# Patient Record
Sex: FEMALE | Race: WHITE | NOT HISPANIC OR LATINO | Employment: PART TIME | URBAN - METROPOLITAN AREA
[De-identification: names, ages, dates, MRNs, and addresses within clinical notes are randomized per-mention and may not be internally consistent; named-entity substitution may affect disease eponyms.]

---

## 2017-01-03 ENCOUNTER — OFFICE VISIT (OUTPATIENT)
Dept: PULMONOLOGY | Facility: CLINIC | Age: 42
End: 2017-01-03

## 2017-01-03 VITALS
BODY MASS INDEX: 32.78 KG/M2 | OXYGEN SATURATION: 98 % | HEART RATE: 70 BPM | SYSTOLIC BLOOD PRESSURE: 122 MMHG | RESPIRATION RATE: 16 BRPM | WEIGHT: 192 LBS | HEIGHT: 64 IN | DIASTOLIC BLOOD PRESSURE: 78 MMHG | TEMPERATURE: 97.9 F

## 2017-01-03 DIAGNOSIS — R06.09 DYSPNEA ON EXERTION: Primary | ICD-10-CM

## 2017-01-03 DIAGNOSIS — J40 BRONCHITIS: ICD-10-CM

## 2017-01-03 PROCEDURE — 94010 BREATHING CAPACITY TEST: CPT | Performed by: INTERNAL MEDICINE

## 2017-01-03 PROCEDURE — 94729 DIFFUSING CAPACITY: CPT | Performed by: INTERNAL MEDICINE

## 2017-01-03 PROCEDURE — 99214 OFFICE O/P EST MOD 30 MIN: CPT | Performed by: INTERNAL MEDICINE

## 2017-01-03 PROCEDURE — 94726 PLETHYSMOGRAPHY LUNG VOLUMES: CPT | Performed by: INTERNAL MEDICINE

## 2017-01-03 NOTE — PATIENT INSTRUCTIONS
Lung function and exam are entirely normal today. I suspect this was a viral syndrome but I cannot rule out allergies. I think seen an allergist as an excellent idea. They may want to add Singulair to your other therapy but I think you should continue nasal steroids and antihistamine's for now. I see no evidence of a pulmonary process causing her symptoms.

## 2017-01-03 NOTE — LETTER
January 3, 2017     Cynthia Simental MD  [address] [facility] [address]  [facility] [address]    Patient: Key Ospina   YOB: 1975   Date of Visit: 1/3/2017       Dear Dr. Hola MD:    Key Ospina was in my office today. Below is a copy of my note.    If you have questions, please do not hesitate to call me. I look forward to following Key along with you.         Sincerely,        Dominick Hill MD        CC: No Recipients    Subjective   Key Ospina is a 41 y.o.  white female nonsmoker whom I have seen on one occasion 2/8/16 for complaints of dyspnea that came on after an upper respiratory tract infection. After antimicrobial therapy she noted dyspnea on exertion and for this reason I was asked to see her. She are been seen in an emergency room January 2016 when her dyspnea became more severe and she had leg edema. Her d-dimer was mildly elevated but CT angiogram was negative for PE. She subsequently underwent a cardiology consult with echocardiogram and a treadmill study at [facility] because she was worried about the possibility of pulmonary hypertension. There was however no evidence of this. The case is complicated because she has a lot of other associated symptomatology over the years including some confusion about positive EEG file titers, positive ANAs and some rheumatologic complaints (for which she underwent rheumatology evaluation). She also gave a history of GERD for which she underwent endoscopy in 2013 revealing only mild esophagitis/gastritis. She also underwent allergy evaluation with a Dr. Avila because of some repetitive upper respiratory symptoms, cough etc. (Has been treated with nonsedating antihistamine and nasal steroids). I was unable to find any abnormalities of any kind on previous CT scans, chest x-rays, PFTs, except for an isolated very mild reduction in diffusion capacity. My plans at that time were to follow her up in 3 months  and if her symptoms persisted I would proceed with the followin. Overnight sleep study for monitoring of oxygen saturations and to look for arrhythmias  2. Methacholine challenge test    History of Present Illness     Subsequent to that visit however, her symptoms essentially resolved and she had no problems so she did not keep a follow-up appointment. She now presents with recurrence of what sounds like an upper respiratory infection the latter part of November (myalgias, low-grade temps, cough and shortness of breath. She was placed on a course of Augmentin and a Medrol Dosepak 16 and did not really improved. She saw the APRN the next day 16 was given some Tessalon. She continued to have congestion and a Z-Papa was called in. She states that her symptoms improved dramatically within 30 hours and she became nearly asymptomatic. Subsequently last week she had a similar episode but much less severe. It again and was associated with some cough and shortness of breath as well as myalgias but this time she noted some hoarseness. These symptoms have now resolved spontaneously and she feels much better. She is still a little hoarse.    The following portions of the patient's history were reviewed and updated as appropriate: allergies, current medications, past family history, past medical history, past social history, past surgical history and problem list.    Review of Systems   Constitutional: Negative for appetite change, chills, diaphoresis, fatigue, fever and unexpected weight change.   HENT: Positive for congestion and voice change (hoarseness improving). Negative for ear discharge, ear pain, hearing loss, postnasal drip, rhinorrhea, sinus pressure, sneezing, sore throat and trouble swallowing.    Eyes: Negative for visual disturbance.   Respiratory: Positive for cough (now essentially resolved) and shortness of breath (now much improved). Negative for choking, chest tightness, wheezing and stridor.     Cardiovascular: Negative for chest pain, palpitations and leg swelling.   Gastrointestinal: Negative for abdominal pain, anal bleeding, blood in stool, constipation, diarrhea, nausea and vomiting.   Endocrine: Negative for cold intolerance, heat intolerance, polydipsia and polyuria.   Genitourinary: Negative for decreased urine volume, difficulty urinating, dysuria, frequency, hematuria and urgency.   Musculoskeletal: Positive for arthralgias and joint swelling (now much improved). Negative for back pain and myalgias.   Skin: Negative for pallor and rash.   Allergic/Immunologic: Negative for environmental allergies, food allergies and immunocompromised state.   Neurological: Negative for dizziness, seizures, syncope, speech difficulty, weakness and headaches.   Hematological: Negative for adenopathy. Does not bruise/bleed easily.   Psychiatric/Behavioral: Negative for confusion, decreased concentration and sleep disturbance. The patient is not nervous/anxious.        Prior to Admission medications    Medication Sig Start Date End Date Taking? Authorizing Provider   albuterol (PROVENTIL HFA;VENTOLIN HFA) 108 (90 BASE) MCG/ACT inhaler ProAir  (90 Base) MCG/ACT Inhalation Aerosol Solution; Patient Sig: ProAir  (90 Base) MCG/ACT Inhalation Aerosol Solution INHALE 2 PUFFS EVERY 4 TO 6 HOURS AS NEEDED FOR COUGH AND SHORTNESS OFBREATH; 1; 5; 12-Jan-2016; Active 1/12/16  Yes Cynthia Simental MD   ALPRAZolam (XANAX) 0.25 MG tablet Take 1 tablet by mouth Daily As Needed (prn). 11/29/16  Yes Cynthia Simental MD   DEXILANT 60 MG capsule TAKE ONE CAPSULE BY MOUTH DAILY  Patient taking differently: TAKE ONE CAPSULE BY MOUTH DAILY  as needed 5/23/16  Yes Cynthia Simental MD   fexofenadine (ALLEGRA) 180 MG tablet Take 1 tablet by mouth daily. 4/25/13  Yes Historical Provider, MD   fluticasone (FLONASE) 50 MCG/ACT nasal spray 2 sprays into each nostril daily. Administer 1 spray in each nostril twice daily. 2/28/16  Yes  "Historical Provider, MD   azithromycin (ZITHROMAX Z-JESSE) 250 MG tablet Take 2 tablets the first day, then 1 tablet daily for 4 days. 12/14/16 1/3/17  Cynthia Simental MD   benzonatate (TESSALON) 200 MG capsule Take 1 capsule by mouth 3 (Three) Times a Day As Needed for cough. 12/2/16 1/3/17  ABDULKADIR Martins   MethylPREDNISolone (MEDROL, JESSE,) 4 MG tablet Take as directed on package instructions. 12/1/16 1/3/17  Cynthia Simental MD   montelukast (SINGULAIR) 10 MG tablet Take 1 tablet by mouth Every Night. 11/29/16 1/3/17  Cynthia Simental MD   pitavastatin calcium (LIVALO) 1 MG tablet tablet Take 1 tablet by mouth Every Night. 11/29/16 1/3/17  Cynthia Simental MD       Objective   Blood pressure 122/78, pulse 70, temperature 97.9 °F (36.6 °C), resp. rate 16, height 63.5\" (161.3 cm), weight 192 lb (87.1 kg), SpO2 98 %.    Flowsheet Rows         First Filed Value    Admission Height  63.5\" (161.3 cm) Documented at 01/03/2017 1405    Admission Weight  192 lb (87.1 kg) Documented at 01/03/2017 1405        Physical Exam   Constitutional: She is oriented to person, place, and time. She appears well-developed and well-nourished.   Young white female who appears in excellent health   HENT:   Head: Normocephalic and atraumatic.   Right Ear: Hearing and external ear normal.   Left Ear: Hearing and external ear normal.   Nose: Nose normal.   Mouth/Throat: Oropharynx is clear and moist. No oropharyngeal exudate.   Eyes: Conjunctivae and EOM are normal. Pupils are equal, round, and reactive to light. Right eye exhibits no discharge. Left eye exhibits no discharge. No scleral icterus.   Neck: Normal range of motion. Neck supple. No JVD present. No tracheal deviation present. No thyromegaly present.   Cardiovascular: Normal rate, regular rhythm, normal heart sounds and intact distal pulses.  Exam reveals no gallop and no friction rub.    No murmur heard.  Pulmonary/Chest: Effort normal and breath sounds normal. No accessory muscle " usage or stridor. No apnea, no tachypnea and no bradypnea. No respiratory distress. She has no wheezes. She has no rhonchi. She has no rales. She exhibits no tenderness.   Abdominal: Soft. Bowel sounds are normal. She exhibits no distension and no mass. There is no splenomegaly or hepatomegaly. There is no tenderness. There is no rebound and no guarding. No hernia.   Musculoskeletal: Normal range of motion. She exhibits no edema, tenderness or deformity.   Lymphadenopathy:        Head (right side): No submandibular adenopathy present.        Head (left side): No submandibular adenopathy present.     She has no cervical adenopathy.        Right: No supraclavicular and no epitrochlear adenopathy present.        Left: No supraclavicular and no epitrochlear adenopathy present.   Neurological: She is alert and oriented to person, place, and time. She has normal reflexes. She displays normal reflexes. No cranial nerve deficit. She exhibits normal muscle tone. Coordination normal.   Skin: Skin is warm and dry. No bruising, no ecchymosis, no petechiae and no rash noted. She is not diaphoretic. No cyanosis or erythema. No pallor. Nails show no clubbing.   Psychiatric: She has a normal mood and affect. Her speech is normal and behavior is normal. Judgment and thought content normal.     Pulmonary function studies: FVC 3.5 (97%), FEV1 2.84 (96%) 6, FEV1/FVC ratio 81%, FEF 25/75 2.89 (93%), MVV 95 (82%), TLC 4.6 (93%), RV 1.09 (67%), RV/TLC ratio 24%, DLCO 20.4 (80%).  These studies are entirely normal and when compared to those of 2/8/16 have if anything shown improvement in FEV1 (which was already normal), and the diffusion impairment has resolved.    Assessment/Plan    Diagnosis Plan   1. Dyspnea on exertion   full PFTs        2. Bronchitis probably post viral     3. Abnormal serologic rheumatologic studies                 [facility]  4. Allergic rhinitis  5. History of GERD  6. History of  T&A, cholecystectomy,  ×2  7. History of ruptured ovarian cyst    Discussion: Her most recent episode of upper respiratory infection associated with dyspnea just sounds like a viral syndrome. I see nothing on exam or pulmonary function studies that would cause me any concern for any underlying pulmonary process. I did not obtain a chest x-ray or CT scan today but I do not think it is necessary as all previous studies have been unremarkable in this episode sounds very similar to those in the past. I think were primarily dealing with an allergic process but I could not rule out some chronic underlying sinusitis    Recommendations:  1. I think she should stay on her nasal steroids and antihistamine's  2. She already has an appointment with an allergist (Dr. Avila) and we will defer to her judgment but may want to add in Singulair at some point or even get a sinus CT scan to make sure were not dealing with an underlying chronic sinusitis  3. With no evidence of any pulmonary abnormality I plan to just see her on an as-needed basis    Dominick Hill MD  Pulmonary and Critical Care Medicine  17 3:44 PM

## 2017-01-03 NOTE — MR AVS SNAPSHOT
Key Ospina   1/3/2017 2:30 PM   Office Visit    Dept Phone:  [phone]   Encounter #:  [id number]    Provider:  Dominick Hill MD   Department:  [facility]                Your Full Care Plan              Today's Medication Changes          These changes are accurate as of: 1/3/17  4:22 PM.  If you have any questions, ask your nurse or doctor.               Stop taking medication(s)listed here:     azithromycin 250 MG tablet   Commonly known as:  ZITHROMAX Z-JESSE           benzonatate 200 MG capsule   Commonly known as:  TESSALON           MethylPREDNISolone 4 MG tablet   Commonly known as:  MEDROL (JESSE)           montelukast 10 MG tablet   Commonly known as:  SINGULAIR           pitavastatin calcium 1 MG tablet tablet   Commonly known as:  LIVALO                      Your Updated Medication List          This list is accurate as of: 1/3/17  4:22 PM.  Always use your most recent med list.                albuterol 108 (90 BASE) MCG/ACT inhaler   Commonly known as:  PROVENTIL HFA;VENTOLIN HFA       ALPRAZolam 0.25 MG tablet   Commonly known as:  XANAX   Take 1 tablet by mouth Daily As Needed (prn).       DEXILANT 60 MG capsule   Generic drug:  dexlansoprazole   TAKE ONE CAPSULE BY MOUTH DAILY       fexofenadine 180 MG tablet   Commonly known as:  ALLEGRA       fluticasone 50 MCG/ACT nasal spray   Commonly known as:  FLONASE               We Performed the Following     Pulmonary Function Test     Spirometry Without Bronchodilator       You Were Diagnosed With        Codes Comments    Dyspnea on exertion    -  Primary ICD-10-CM: R06.09  ICD-9-CM: 786.09     Bronchitis     ICD-10-CM: J40  ICD-9-CM: 490       Instructions    Lung function and exam are entirely normal today. I suspect this was a viral syndrome but I cannot rule out allergies. I think seen an allergist as an excellent idea. They may want to add Singulair to your other therapy but I think  "you should continue nasal steroids and antihistamine's for now. I see no evidence of a pulmonary process causing her symptoms.     Patient Instructions History      Upcoming Appointments     Visit Type Date Time Department    FOLLOW UP 1/3/2017  2:30 PM [facility]      MyChart Signup     Our records indicate that you have declined [facility] discoapihart signup. If you would like to sign up for [employer], please email [email or web address] or call [phone] to obtain an activation code.             Other Info from Your Visit           Allergies     Latex      Latex exam gloves    Methylprednisolone Intolerance     Manic feeling like she is climbing the wall       Reason for Visit     Shortness of Breath           Vital Signs     Blood Pressure Pulse Temperature Respirations Height Weight    122/78 70 97.9 °F (36.6 °C) 16 63.5\" (161.3 cm) 192 lb (87.1 kg)    Oxygen Saturation Body Mass Index Smoking Status             98% 33.48 kg/m2 Never Smoker         Problems and Diagnoses Noted     Breathlessness on exertion    Bronchitis          Results     Spirometry Without Bronchodilator           Pulmonary Function Test               "

## 2017-01-03 NOTE — PROGRESS NOTES
Subjective   Key Ospina is a 41 y.o.  white female nonsmoker whom I have seen on one occasion 16 for complaints of dyspnea that came on after an upper respiratory tract infection. After antimicrobial therapy she noted dyspnea on exertion and for this reason I was asked to see her. She are been seen in an emergency room 2016 when her dyspnea became more severe and she had leg edema. Her d-dimer was mildly elevated but CT angiogram was negative for PE. She subsequently underwent a cardiology consult with echocardiogram and a treadmill study at [facility] because she was worried about the possibility of pulmonary hypertension. There was however no evidence of this. The case is complicated because she has a lot of other associated symptomatology over the years including some confusion about positive EEG file titers, positive ANAs and some rheumatologic complaints (for which she underwent rheumatology evaluation). She also gave a history of GERD for which she underwent endoscopy in  revealing only mild esophagitis/gastritis. She also underwent allergy evaluation with a Dr. Avila because of some repetitive upper respiratory symptoms, cough etc. (Has been treated with nonsedating antihistamine and nasal steroids). I was unable to find any abnormalities of any kind on previous CT scans, chest x-rays, PFTs, except for an isolated very mild reduction in diffusion capacity. My plans at that time were to follow her up in 3 months and if her symptoms persisted I would proceed with the followin. Overnight sleep study for monitoring of oxygen saturations and to look for arrhythmias  2. Methacholine challenge test    History of Present Illness     Subsequent to that visit however, her symptoms essentially resolved and she had no problems so she did not keep a follow-up appointment. She now presents with recurrence of what sounds like an upper respiratory infection the latter part  of November (myalgias, low-grade temps, cough and shortness of breath. She was placed on a course of Augmentin and a Medrol Dosepak 12/1/16 and did not really improved. She saw the APRN the next day 12/2/16 was given some Tessalon. She continued to have congestion and a Z-Papa was called in. She states that her symptoms improved dramatically within 30 hours and she became nearly asymptomatic. Subsequently last week she had a similar episode but much less severe. It again and was associated with some cough and shortness of breath as well as myalgias but this time she noted some hoarseness. These symptoms have now resolved spontaneously and she feels much better. She is still a little hoarse.    The following portions of the patient's history were reviewed and updated as appropriate: allergies, current medications, past family history, past medical history, past social history, past surgical history and problem list.    Review of Systems   Constitutional: Negative for appetite change, chills, diaphoresis, fatigue, fever and unexpected weight change.   HENT: Positive for congestion and voice change (hoarseness improving). Negative for ear discharge, ear pain, hearing loss, postnasal drip, rhinorrhea, sinus pressure, sneezing, sore throat and trouble swallowing.    Eyes: Negative for visual disturbance.   Respiratory: Positive for cough (now essentially resolved) and shortness of breath (now much improved). Negative for choking, chest tightness, wheezing and stridor.    Cardiovascular: Negative for chest pain, palpitations and leg swelling.   Gastrointestinal: Negative for abdominal pain, anal bleeding, blood in stool, constipation, diarrhea, nausea and vomiting.   Endocrine: Negative for cold intolerance, heat intolerance, polydipsia and polyuria.   Genitourinary: Negative for decreased urine volume, difficulty urinating, dysuria, frequency, hematuria and urgency.   Musculoskeletal: Positive for arthralgias and joint  swelling (now much improved). Negative for back pain and myalgias.   Skin: Negative for pallor and rash.   Allergic/Immunologic: Negative for environmental allergies, food allergies and immunocompromised state.   Neurological: Negative for dizziness, seizures, syncope, speech difficulty, weakness and headaches.   Hematological: Negative for adenopathy. Does not bruise/bleed easily.   Psychiatric/Behavioral: Negative for confusion, decreased concentration and sleep disturbance. The patient is not nervous/anxious.        Prior to Admission medications    Medication Sig Start Date End Date Taking? Authorizing Provider   albuterol (PROVENTIL HFA;VENTOLIN HFA) 108 (90 BASE) MCG/ACT inhaler ProAir  (90 Base) MCG/ACT Inhalation Aerosol Solution; Patient Sig: ProAir  (90 Base) MCG/ACT Inhalation Aerosol Solution INHALE 2 PUFFS EVERY 4 TO 6 HOURS AS NEEDED FOR COUGH AND SHORTNESS OFBREATH; 1; 5; 12-Jan-2016; Active 1/12/16  Yes Cynthia Simental MD   ALPRAZolam (XANAX) 0.25 MG tablet Take 1 tablet by mouth Daily As Needed (prn). 11/29/16  Yes Cynthia Smiental MD   DEXILANT 60 MG capsule TAKE ONE CAPSULE BY MOUTH DAILY  Patient taking differently: TAKE ONE CAPSULE BY MOUTH DAILY  as needed 5/23/16  Yes Cynthia Simental MD   fexofenadine (ALLEGRA) 180 MG tablet Take 1 tablet by mouth daily. 4/25/13  Yes Historical Provider, MD   fluticasone (FLONASE) 50 MCG/ACT nasal spray 2 sprays into each nostril daily. Administer 1 spray in each nostril twice daily. 2/28/16  Yes Historical Provider, MD   azithromycin (ZITHROMAX Z-JESSE) 250 MG tablet Take 2 tablets the first day, then 1 tablet daily for 4 days. 12/14/16 1/3/17  Cynthia Simental MD   benzonatate (TESSALON) 200 MG capsule Take 1 capsule by mouth 3 (Three) Times a Day As Needed for cough. 12/2/16 1/3/17  ABDULKADIR Martins   MethylPREDNISolone (MEDROL, JESSE,) 4 MG tablet Take as directed on package instructions. 12/1/16 1/3/17  Cynthia Simental MD   montelukast (SINGULAIR)  "10 MG tablet Take 1 tablet by mouth Every Night. 11/29/16 1/3/17  Cynthia Simental MD   pitavastatin calcium (LIVALO) 1 MG tablet tablet Take 1 tablet by mouth Every Night. 11/29/16 1/3/17  Cynthia Simental MD       Objective   Blood pressure 122/78, pulse 70, temperature 97.9 °F (36.6 °C), resp. rate 16, height 63.5\" (161.3 cm), weight 192 lb (87.1 kg), SpO2 98 %.    Flowsheet Rows         First Filed Value    Admission Height  63.5\" (161.3 cm) Documented at 01/03/2017 1405    Admission Weight  192 lb (87.1 kg) Documented at 01/03/2017 1405        Physical Exam   Constitutional: She is oriented to person, place, and time. She appears well-developed and well-nourished.   Young white female who appears in excellent health   HENT:   Head: Normocephalic and atraumatic.   Right Ear: Hearing and external ear normal.   Left Ear: Hearing and external ear normal.   Nose: Nose normal.   Mouth/Throat: Oropharynx is clear and moist. No oropharyngeal exudate.   Eyes: Conjunctivae and EOM are normal. Pupils are equal, round, and reactive to light. Right eye exhibits no discharge. Left eye exhibits no discharge. No scleral icterus.   Neck: Normal range of motion. Neck supple. No JVD present. No tracheal deviation present. No thyromegaly present.   Cardiovascular: Normal rate, regular rhythm, normal heart sounds and intact distal pulses.  Exam reveals no gallop and no friction rub.    No murmur heard.  Pulmonary/Chest: Effort normal and breath sounds normal. No accessory muscle usage or stridor. No apnea, no tachypnea and no bradypnea. No respiratory distress. She has no wheezes. She has no rhonchi. She has no rales. She exhibits no tenderness.   Abdominal: Soft. Bowel sounds are normal. She exhibits no distension and no mass. There is no splenomegaly or hepatomegaly. There is no tenderness. There is no rebound and no guarding. No hernia.   Musculoskeletal: Normal range of motion. She exhibits no edema, tenderness or deformity. "   Lymphadenopathy:        Head (right side): No submandibular adenopathy present.        Head (left side): No submandibular adenopathy present.     She has no cervical adenopathy.        Right: No supraclavicular and no epitrochlear adenopathy present.        Left: No supraclavicular and no epitrochlear adenopathy present.   Neurological: She is alert and oriented to person, place, and time. She has normal reflexes. She displays normal reflexes. No cranial nerve deficit. She exhibits normal muscle tone. Coordination normal.   Skin: Skin is warm and dry. No bruising, no ecchymosis, no petechiae and no rash noted. She is not diaphoretic. No cyanosis or erythema. No pallor. Nails show no clubbing.   Psychiatric: She has a normal mood and affect. Her speech is normal and behavior is normal. Judgment and thought content normal.     Pulmonary function studies: FVC 3.5 (97%), FEV1 2.84 (96%) 6, FEV1/FVC ratio 81%, FEF 25/75 2.89 (93%), MVV 95 (82%), TLC 4.6 (93%), RV 1.09 (67%), RV/TLC ratio 24%, DLCO 20.4 (80%).  These studies are entirely normal and when compared to those of 16 have if anything shown improvement in FEV1 (which was already normal), and the diffusion impairment has resolved.    Assessment/Plan    Diagnosis Plan   1. Dyspnea on exertion   full PFTs        2. Bronchitis probably post viral     3. Abnormal serologic rheumatologic studies                 [facility]  4. Allergic rhinitis  5. History of GERD  6. History of T&A, cholecystectomy,  ×2  7. History of ruptured ovarian cyst    Discussion: Her most recent episode of upper respiratory infection associated with dyspnea just sounds like a viral syndrome. I see nothing on exam or pulmonary function studies that would cause me any concern for any underlying pulmonary process. I did not obtain a chest x-ray or CT scan today but I do not think it is necessary as all previous studies have been unremarkable in this  episode sounds very similar to those in the past. I think were primarily dealing with an allergic process but I could not rule out some chronic underlying sinusitis    Recommendations:  1. I think she should stay on her nasal steroids and antihistamine's  2. She already has an appointment with an allergist (Dr. Avila) and we will defer to her judgment but may want to add in Singulair at some point or even get a sinus CT scan to make sure were not dealing with an underlying chronic sinusitis  3. With no evidence of any pulmonary abnormality I plan to just see her on an as-needed basis    Dominick Hill MD  Pulmonary and Critical Care Medicine  01/03/17 3:44 PM

## 2017-01-20 ENCOUNTER — TELEPHONE (OUTPATIENT)
Dept: INTERNAL MEDICINE | Facility: CLINIC | Age: 42
End: 2017-01-20

## 2017-01-20 DIAGNOSIS — R76.8 ANA POSITIVE: Primary | ICD-10-CM

## 2017-01-24 ENCOUNTER — LAB (OUTPATIENT)
Dept: INTERNAL MEDICINE | Facility: CLINIC | Age: 42
End: 2017-01-24

## 2017-01-24 DIAGNOSIS — R76.8 ANA POSITIVE: ICD-10-CM

## 2017-01-24 LAB
ANION GAP SERPL CALCULATED.3IONS-SCNC: 8 MMOL/L (ref 3–11)
BILIRUB BLD-MCNC: NEGATIVE MG/DL
BUN BLD-MCNC: 14 MG/DL (ref 9–23)
BUN/CREAT SERPL: 17.5 (ref 7–25)
CALCIUM SPEC-SCNC: 9.9 MG/DL (ref 8.7–10.4)
CHLORIDE SERPL-SCNC: 103 MMOL/L (ref 99–109)
CLARITY, POC: CLEAR
CO2 SERPL-SCNC: 28 MMOL/L (ref 20–31)
COLOR UR: YELLOW
CREAT BLD-MCNC: 0.8 MG/DL (ref 0.6–1.3)
EXPIRATION DATE: NORMAL
GFR SERPL CREATININE-BSD FRML MDRD: 79 ML/MIN/1.73
GLUCOSE BLD-MCNC: 95 MG/DL (ref 70–100)
GLUCOSE UR STRIP-MCNC: NEGATIVE MG/DL
KETONES UR QL: NEGATIVE
LEUKOCYTE EST, POC: NEGATIVE
Lab: NORMAL
NITRITE UR-MCNC: NEGATIVE MG/ML
PH UR: 5 [PH] (ref 5–8)
POTASSIUM BLD-SCNC: 4.6 MMOL/L (ref 3.5–5.5)
PROT UR STRIP-MCNC: NEGATIVE MG/DL
RBC # UR STRIP: NEGATIVE /UL
SODIUM BLD-SCNC: 139 MMOL/L (ref 132–146)
SP GR UR: 1.02 (ref 1–1.03)
UROBILINOGEN UR QL: NORMAL

## 2017-01-24 PROCEDURE — 81002 URINALYSIS NONAUTO W/O SCOPE: CPT | Performed by: INTERNAL MEDICINE

## 2017-01-24 PROCEDURE — 80048 BASIC METABOLIC PNL TOTAL CA: CPT | Performed by: INTERNAL MEDICINE

## 2017-01-24 PROCEDURE — 36415 COLL VENOUS BLD VENIPUNCTURE: CPT | Performed by: INTERNAL MEDICINE

## 2017-01-30 ENCOUNTER — TELEPHONE (OUTPATIENT)
Dept: INTERNAL MEDICINE | Facility: CLINIC | Age: 42
End: 2017-01-30

## 2017-01-30 RX ORDER — ALBUTEROL SULFATE 90 UG/1
2 AEROSOL, METERED RESPIRATORY (INHALATION) EVERY 4 HOURS PRN
Qty: 1 INHALER | Refills: 11 | Status: SHIPPED | OUTPATIENT
Start: 2017-01-30 | End: 2017-01-30 | Stop reason: SDUPTHER

## 2017-01-30 RX ORDER — ALBUTEROL SULFATE 90 UG/1
AEROSOL, METERED RESPIRATORY (INHALATION)
Qty: 1 INHALER | Refills: 11 | OUTPATIENT
Start: 2017-01-30 | End: 2017-03-17 | Stop reason: SDUPTHER

## 2017-01-30 NOTE — TELEPHONE ENCOUNTER
----- Message from Lenora Moscoso sent at 1/30/2017 12:00 PM EST -----  Contact: [place] PH:[phone]  [place] PHARMACIST CALLING IN REGARDS TO BEVERLEY HINES TO CLARIFY INSTRUCTIONS FOR THE ALBUTEROL INHALER. THE PHARMACIST CAN BE REACHED [phone]

## 2017-01-30 NOTE — TELEPHONE ENCOUNTER
----- Message from Norma Gregory sent at 1/30/2017 10:18 AM EST -----  Contact: self  Pt called needing refill on Rx Proair. She can be reached at [phone]      [facility]- [facility]

## 2017-03-03 ENCOUNTER — TELEPHONE (OUTPATIENT)
Dept: INTERNAL MEDICINE | Facility: CLINIC | Age: 42
End: 2017-03-03

## 2017-03-03 ENCOUNTER — HOSPITAL ENCOUNTER (OUTPATIENT)
Dept: GENERAL RADIOLOGY | Facility: HOSPITAL | Age: 42
Discharge: HOME | End: 2017-03-03
Attending: INTERNAL MEDICINE | Admitting: INTERNAL MEDICINE

## 2017-03-03 ENCOUNTER — OFFICE VISIT (OUTPATIENT)
Dept: INTERNAL MEDICINE | Facility: CLINIC | Age: 42
End: 2017-03-03

## 2017-03-03 VITALS
BODY MASS INDEX: 32.1 KG/M2 | TEMPERATURE: 96.4 F | RESPIRATION RATE: 20 BRPM | DIASTOLIC BLOOD PRESSURE: 82 MMHG | HEART RATE: 72 BPM | WEIGHT: 184.13 LBS | SYSTOLIC BLOOD PRESSURE: 140 MMHG

## 2017-03-03 DIAGNOSIS — J30.89 NON-SEASONAL ALLERGIC RHINITIS DUE TO OTHER ALLERGIC TRIGGER: ICD-10-CM

## 2017-03-03 DIAGNOSIS — J06.9 UPPER RESPIRATORY TRACT INFECTION, UNSPECIFIED TYPE: Primary | ICD-10-CM

## 2017-03-03 DIAGNOSIS — R05.9 COUGH: ICD-10-CM

## 2017-03-03 PROCEDURE — 71020 HC CHEST PA AND LATERAL: CPT

## 2017-03-03 PROCEDURE — 86580 TB INTRADERMAL TEST: CPT | Performed by: INTERNAL MEDICINE

## 2017-03-03 PROCEDURE — 99214 OFFICE O/P EST MOD 30 MIN: CPT | Performed by: INTERNAL MEDICINE

## 2017-03-03 RX ORDER — AZELASTINE HCL 205.5 UG/1
2 SPRAY NASAL 2 TIMES DAILY PRN
Qty: 1 EACH | Refills: 5 | Status: SHIPPED | OUTPATIENT
Start: 2017-03-03 | End: 2017-06-23

## 2017-03-03 NOTE — TELEPHONE ENCOUNTER
Spoke with Bronson and she states she was not swabbed for whooping cough  Today at her appt.   Advised her to come in on Monday morning for us to collect.  Verb understanding given.

## 2017-03-03 NOTE — PROGRESS NOTES
Subjective       Key Ospina is a 41 y.o. female.     Chief Complaint   Patient presents with   • URI     took zpack last week    • Cough     productive light green  shortness of breath         History obtained from the patient.      URI    This is a new problem. Episode onset: 3 weeks ago. The problem has been gradually improving. The maximum temperature recorded prior to her arrival was 100.4 - 100.9 F. Associated symptoms include coughing (harsh, dry, productive of purulent sputum), ear pain (intermittent pressure), headaches, joint pain (improved), neck pain, a rash (occasional on chest and face), sinus pain (resolved) and sneezing (resolved). Pertinent negatives include no abdominal pain, chest pain, congestion, diarrhea, joint swelling, nausea, rhinorrhea, sore throat, swollen glands, vomiting or wheezing. She has tried NSAIDs (On Allegra and Flonase daily.  Tokk 7 days of Augmentin at home, then given a Zpack 1 week ago at Urgent Care.   Using her inhaler every 3-4 days.) for the symptoms. The treatment provided mild relief.      The patient denies known exposure to TB and Pertussis.      The following portions of the patient's history were reviewed and updated as appropriate: allergies, current medications, past family history, past medical history, past social history, past surgical history and problem list.      Review of Systems   Constitutional: Positive for fever.   HENT: Positive for ear pain (intermittent pressure), sinus pressure (resolved), sneezing (resolved) and voice change (hoarse). Negative for congestion, postnasal drip, rhinorrhea and sore throat.    Eyes: Negative for pain, discharge, redness and itching.   Respiratory: Positive for cough (harsh, dry, productive of purulent sputum), chest tightness and shortness of breath. Negative for wheezing.    Cardiovascular: Negative for chest pain.   Gastrointestinal: Negative for abdominal pain, diarrhea, nausea and vomiting.    Musculoskeletal: Positive for arthralgias, joint pain (improved) and neck pain. Negative for myalgias.   Skin: Positive for rash (occasional on chest and face).   Neurological: Positive for headaches.   Hematological: Negative for adenopathy.         Blood pressure 140/82, pulse 72, temperature 96.4 °F (35.8 °C), temperature source Temporal Artery , resp. rate 20, weight 184 lb 2 oz (83.5 kg).      Objective     Physical Exam   Constitutional:   Overweight.   HENT:   Head: Normocephalic and atraumatic.   Right Ear: Tympanic membrane, external ear and ear canal normal.   Left Ear: Tympanic membrane, external ear and ear canal normal.   Mouth/Throat: Oropharynx is clear and moist and mucous membranes are normal. No oral lesions.   Tonsils normal.  No sinus tenderness to palpation.   Eyes: Conjunctivae are normal.   Neck: Normal range of motion. Neck supple.   Cardiovascular: Normal rate and regular rhythm.    No murmur heard.  Pulmonary/Chest: Effort normal and breath sounds normal.   There is a harsh dry cough.   Lymphadenopathy:     She has no cervical adenopathy.   Skin: No rash noted.   Psychiatric: She has a normal mood and affect.   Nursing note and vitals reviewed.        Assessment/Plan   Key was seen today for uri and cough.    Diagnoses and all orders for this visit:    Upper respiratory tract infection, unspecified type    Cough  -     XR chest pa and lateral  -     TB Skin Test  -     B Pertussis Culture    Non-seasonal allergic rhinitis due to other allergic trigger  -     azelastine (ASTEPRO) 0.15 % solution nasal spray; 2 sprays into each nostril 2 (Two) Times a Day As Needed for allergies.    Recommend continue current medication.  May add Delsym.    Return for Next scheduled follow up.

## 2017-03-03 NOTE — TELEPHONE ENCOUNTER
----- Message from Lenora Moscoso sent at 3/3/2017  2:19 PM EST -----  Contact: BEVERLEY FLORA  BEVERLEY THOMASON CALLING WITH A QUESTION ABOUT THE APPT TODAY. SHE DOESN'T WANT TO GO INTO DETAIL OTHER THAN SOMETHING MIGHT HAVE BEEN LEFT OUT OF THE APPT. SHE CAN BE REACHED [phone]

## 2017-03-06 LAB
INDURATION: 0 MM (ref 0–10)
TB SKIN TEST: NEGATIVE

## 2017-03-20 RX ORDER — ALBUTEROL SULFATE 90 UG/1
AEROSOL, METERED RESPIRATORY (INHALATION)
Qty: 1 INHALER | Refills: 11 | OUTPATIENT
Start: 2017-03-20 | End: 2017-03-21

## 2017-03-21 ENCOUNTER — TELEPHONE (OUTPATIENT)
Dept: INTERNAL MEDICINE | Facility: CLINIC | Age: 42
End: 2017-03-21

## 2017-03-21 RX ORDER — ALBUTEROL SULFATE 90 UG/1
2 AEROSOL, METERED RESPIRATORY (INHALATION) EVERY 4 HOURS PRN
Qty: 1 INHALER | Refills: 11 | OUTPATIENT
Start: 2017-03-21 | End: 2019-03-14

## 2017-03-21 NOTE — TELEPHONE ENCOUNTER
----- Message from Norma Gregory sent at 3/21/2017  8:55 AM EDT -----  RIGOBERTO WITH [facility] CALLED AND STATED PT WANTED TO CHANGE HER INHALER TO PROAIR AND THEY NEEDED CONFIRMATION THAT THIS WAS OKAY. SHE STATED THAT THE PATIENT IS LEAVING TOWN AT NOON AND WOULD LIKE AN ANSWER BEFORE THIS. SHE CAN BE REACHED [phone]

## 2017-05-05 ENCOUNTER — TELEPHONE (OUTPATIENT)
Dept: INTERNAL MEDICINE | Facility: CLINIC | Age: 42
End: 2017-05-05

## 2017-05-05 DIAGNOSIS — H10.30 ACUTE CONJUNCTIVITIS, UNSPECIFIED ACUTE CONJUNCTIVITIS TYPE, UNSPECIFIED LATERALITY: Primary | ICD-10-CM

## 2017-05-05 RX ORDER — SULFACETAMIDE SODIUM 100 MG/ML
2 SOLUTION/ DROPS OPHTHALMIC
Qty: 15 ML | Refills: 0 | Status: SHIPPED | OUTPATIENT
Start: 2017-05-05 | End: 2017-06-23

## 2017-06-14 ENCOUNTER — TELEPHONE (OUTPATIENT)
Dept: INTERNAL MEDICINE | Facility: CLINIC | Age: 42
End: 2017-06-14

## 2017-06-14 NOTE — TELEPHONE ENCOUNTER
----- Message from Judy Beltran sent at 6/13/2017  3:14 PM EDT -----  [phone]    PT NEEDS TO FU ABOUT SOME SPECIALTY APPTS SHE HAS HAD.  PLEASE CALL TO DISCUSS    PT UNDERSTANDS THIS MESSAGE WILL NOT BE SEEN UNTIL Wednesday 6/14

## 2017-06-14 NOTE — TELEPHONE ENCOUNTER
BEBA:  Spoke with Pt and she states she is going to see a new pulmonologist next Friday the 23rd. States she will call you back after that appointment to update you.

## 2017-06-23 ENCOUNTER — OFFICE VISIT (OUTPATIENT)
Dept: PULMONOLOGY | Facility: CLINIC | Age: 42
End: 2017-06-23

## 2017-06-23 ENCOUNTER — HOSPITAL ENCOUNTER (OUTPATIENT)
Dept: CT IMAGING | Facility: HOSPITAL | Age: 42
Discharge: HOME | End: 2017-06-23
Attending: INTERNAL MEDICINE | Admitting: INTERNAL MEDICINE

## 2017-06-23 VITALS
RESPIRATION RATE: 16 BRPM | DIASTOLIC BLOOD PRESSURE: 74 MMHG | WEIGHT: 180 LBS | HEART RATE: 61 BPM | HEIGHT: 64 IN | TEMPERATURE: 97.3 F | SYSTOLIC BLOOD PRESSURE: 118 MMHG | OXYGEN SATURATION: 98 % | BODY MASS INDEX: 30.73 KG/M2

## 2017-06-23 DIAGNOSIS — R06.09 DYSPNEA ON EXERTION: Primary | ICD-10-CM

## 2017-06-23 DIAGNOSIS — R76.0 ELEVATED EBV ANTIBODY TITER QUANTITATIVE: ICD-10-CM

## 2017-06-23 DIAGNOSIS — J30.1 NON-SEASONAL ALLERGIC RHINITIS DUE TO POLLEN: ICD-10-CM

## 2017-06-23 DIAGNOSIS — M32.9 LUPUS (SYSTEMIC LUPUS ERYTHEMATOSUS) (HCC): ICD-10-CM

## 2017-06-23 PROCEDURE — 99214 OFFICE O/P EST MOD 30 MIN: CPT | Performed by: INTERNAL MEDICINE

## 2017-06-23 PROCEDURE — 71250 CT THORAX DX C-: CPT

## 2017-06-23 NOTE — PATIENT INSTRUCTIONS
While you carry a diagnosis of lupus and had symptoms of recurrent upper respiratory infection, to date there is been no evidence of lung involvement on pulmonary function studies, scans, etc. In addition echocardiograms at [facility] have not revealed pulmonary hypertension    Recommendation:  1. High-resolution CT scan of the chest today for a baseline  2. Follow-up in our office in one year with repeat pulmonary function studies. We will decide at that time whether to repeat high-resolution CT of the chest for comparison or to just continue to observe clinically  3. Would recommend yearly echocardiogram for now  4. The decision on therapy will be up to your rheumatologist, but at present I do not think is unreasonable to just continue to observe without institution of therapy  5. Absolutely essential that you keep up-to-date with all recommended vaccinations

## 2017-06-23 NOTE — PROGRESS NOTES
Subjective   Key Ospina is a 41 y.o.  white female nonsmoker who has seen on 2 prior occasions. 2/8/16 and 1/3/17. These were for complaints of dyspnea that would come on after recurrent upper respiratory infections (in the winter). She has been evaluated in various places for these symptoms and has actually had CT angiograms that have been negative for PE. She has also seen cardiology at [facility] and has undergone echocardiograms and treadmill studies both of which were unremarkable. Interestingly enough she has had positive serologic studies consistent with a diagnosis of lupus but she does not meet full criteria for lupus (had positive ANAs, as well as double-stranded DNA's). Has undergone rheumatologic evaluation the past and her most recent evaluation by Dr. Luna at [place] 4/2017 indicates that she is now felt to have lupus. The possibility of treatment with agents such as Plaquenil has been entertained but she does not seem to have end organ damage, so for now therapy is being held. She has been told by her rheumatologist at [place] to continue follow-up with pulmonary medicine.    History of Present Illness   She is actually doing fairly well but still has dyspnea on exertion. There is no chronic purulent cough, hemoptysis, pleuritic pain and no chronic lower extremity edema, PND, orthopnea, palpitations, or exertional left chest pain. Does however note occasional chest tightness    The following portions of the patient's history were reviewed and updated as appropriate: allergies, current medications, past family history, past medical history, past social history, past surgical history and problem list.    Review of Systems   Constitutional: Negative for appetite change, chills, diaphoresis, fatigue, fever and unexpected weight change.   HENT: Positive for tinnitus. Negative for congestion, ear discharge, ear pain, hearing loss, postnasal drip, rhinorrhea, sinus  pressure, sneezing, sore throat, trouble swallowing and voice change.         Occasional swelling of salivary glands   Eyes: Negative for visual disturbance.   Respiratory: Positive for chest tightness and shortness of breath. Negative for cough, choking, wheezing and stridor.    Cardiovascular: Negative for chest pain, palpitations and leg swelling.   Gastrointestinal: Negative for abdominal pain, anal bleeding, blood in stool, constipation, diarrhea, nausea and vomiting.   Endocrine: Positive for cold intolerance. Negative for heat intolerance, polydipsia and polyuria.   Genitourinary: Negative for decreased urine volume, difficulty urinating, dysuria, frequency, hematuria and urgency.   Musculoskeletal: Positive for arthralgias and joint swelling. Negative for back pain and myalgias.   Skin: Negative for pallor and rash.   Allergic/Immunologic: Negative for environmental allergies, food allergies and immunocompromised state.   Neurological: Negative for dizziness, seizures, syncope, speech difficulty, weakness and headaches.   Hematological: Negative for adenopathy. Does not bruise/bleed easily.   Psychiatric/Behavioral: Positive for sleep disturbance. Negative for confusion and decreased concentration. The patient is not nervous/anxious.    All other systems reviewed and are negative.      Medications    Medication Sig Start Date End Date Taking? Authorizing Provider   albuterol (PROAIR HFA) 108 (90 BASE) MCG/ACT inhaler Inhale 2 puffs Every 4 (Four) Hours As Needed for Wheezing. 3/21/17  Yes Cynthia Simental MD   ALPRAZolam (XANAX) 0.25 MG tablet Take 1 tablet by mouth Daily As Needed (prn). 11/29/16  Yes Cynthia Simental MD   fexofenadine (ALLEGRA) 180 MG tablet Take 1 tablet by mouth daily. 4/25/13  Yes Historical Provider, MD   fluticasone (FLONASE) 50 MCG/ACT nasal spray 2 sprays into each nostril daily. Administer 1 spray in each nostril twice daily. 2/28/16  Yes Historical Provider, MD   azelastine (ASTEPRO)  "0.15 % solution nasal spray 2 sprays into each nostril 2 (Two) Times a Day As Needed for allergies. 3/3/17 6/23/17  Cynthia Simental MD   sulfacetamide (BLEPH-10) 10 % ophthalmic solution Administer 2 drops to both eyes Every 3 (Three) Hours. 5/5/17 6/23/17  Cynthia Simental MD       Objective   Blood pressure 118/74, pulse 61, temperature 97.3 °F (36.3 °C), resp. rate 16, height 63.5\" (161.3 cm), weight 180 lb (81.6 kg), SpO2 98 %.    Flowsheet Rows         First Filed Value    Admission Height  63.5\" (161.3 cm) Documented at 06/23/2017 0833    Admission Weight  180 lb (81.6 kg) Documented at 06/23/2017 0833        Physical Exam   Constitutional: She is oriented to person, place, and time. She appears well-nourished.   Well-developed attractive white female in no acute distress   HENT:   Head: Normocephalic and atraumatic.   Right Ear: Hearing and external ear normal.   Left Ear: Hearing and external ear normal.   Nose: Nose normal.   Mouth/Throat: Oropharynx is clear and moist. No oropharyngeal exudate.   Eyes: Conjunctivae and EOM are normal. Pupils are equal, round, and reactive to light. Right eye exhibits no discharge. Left eye exhibits no discharge. No scleral icterus.   Neck: Normal range of motion. Neck supple. No JVD present. No tracheal deviation present. No thyromegaly present.   Cardiovascular: Normal rate, regular rhythm, normal heart sounds and intact distal pulses.  Exam reveals no gallop and no friction rub.    No murmur heard.  Pulmonary/Chest: Effort normal and breath sounds normal. No accessory muscle usage or stridor. No apnea, no tachypnea and no bradypnea. No respiratory distress. She has no wheezes. She has no rhonchi. She has no rales. She exhibits no tenderness.   Abdominal: Soft. Bowel sounds are normal. She exhibits no distension and no mass. There is no splenomegaly or hepatomegaly. There is no tenderness. There is no rebound and no guarding. No hernia.   Musculoskeletal: Normal range of " motion. She exhibits no edema, tenderness or deformity.   Lymphadenopathy:        Head (right side): No submandibular adenopathy present.        Head (left side): No submandibular adenopathy present.     She has no cervical adenopathy.        Right: No supraclavicular and no epitrochlear adenopathy present.        Left: No supraclavicular and no epitrochlear adenopathy present.   Neurological: She is alert and oriented to person, place, and time. She has normal reflexes. She displays normal reflexes. No cranial nerve deficit. She exhibits normal muscle tone. Coordination normal.   Skin: Skin is warm and dry. No bruising, no ecchymosis, no petechiae and no rash noted. She is not diaphoretic. No cyanosis or erythema. No pallor. Nails show no clubbing.   Psychiatric: She has a normal mood and affect. Her speech is normal and behavior is normal. Judgment and thought content normal.   Nursing note and vitals reviewed.      Assessment/Plan   1. Dyspnea on exertion  2. Diagnosis of lupus with abnormal serologic studies (YOKO and DS DNA)  A. Followed by Dr. Luna rheumatologist at [facility]  3. Allergic rhinitis  4. History of GERD  5. History of T&A, cholecystectomy,  ×2  6. History of ruptured ovarian cyst    Discussion: Patient's main focus is to avoid progression of underlying disease process with the development of an organ damage (lung or kidney), and to pick it up early if it does occur. She does not really want therapy at the present time but does want close follow-up. I think this is entirely reasonable    Plan:  1. High-resolution CT scan of the chest today for for baseline (will watch for ILD)  2. We will follow up in one year with full PFTs to watch for restrictive ventilatory defect  3. I recommended a repeat echocardiogram next year as well  4. Depending on her pulmonary symptomatology can repeat CT scans as necessary and compare back to her baseline study which will be obtained today    Dominick FAYE  MD [facility]  06/23/17 9:29 AM

## 2017-06-26 ENCOUNTER — TRANSCRIBE ORDERS (OUTPATIENT)
Dept: PULMONOLOGY | Facility: CLINIC | Age: 42
End: 2017-06-26

## 2017-06-26 ENCOUNTER — TELEPHONE (OUTPATIENT)
Dept: INTERNAL MEDICINE | Facility: CLINIC | Age: 42
End: 2017-06-26

## 2017-06-26 PROBLEM — R76.8 POSITIVE ANA (ANTINUCLEAR ANTIBODY): Status: ACTIVE | Noted: 2017-06-23

## 2017-06-26 NOTE — TELEPHONE ENCOUNTER
CT lung negative with the exception of mild  bronchiectasis (enlargement of airways) in the bases of the lungs.  This could possibly be related to her shortness of breath, but it is low grade.  Can be seen in immune disorders, but not necessarily related to Lupus..

## 2017-06-26 NOTE — TELEPHONE ENCOUNTER
PT RETURNED CALL.  BELOW INFO PER DR HOWE GIVEN.  VERB UNDERSTANDING BUT STATES WOULD LIKE TO KNOW IF DR HOWE HAS ANY IDEA WHAT WOULD CAUSE THAT IF NOT RELATED TO THE LUPUS.  STATES SHE IS TREATING HOLILISTICALLY  CURRENTLY BUT THAT SHE MIGHT CONSIDER THE PLAQUENIL THAT PULMONARY IS SUGGESTING.  STATES SHE WOULD LIKE TO SEE IF SHE NEEDS TO SCHEDULE AN APPT TO COME IN AND FOLLOW UP OR WHAT DR HOWE RECOMMENDS.

## 2017-06-26 NOTE — TELEPHONE ENCOUNTER
Called and spoke with patient regarding lung CT results.  Questions answered.  Discussed her possible diagnosis of lupus with regard to her pulmonary symptoms.  She will speak with Dr. Glynn again to clarify her diagnosis.  She will call me back after doing that and also speaking with Dr. Hill regarding CT results.

## 2017-06-26 NOTE — TELEPHONE ENCOUNTER
----- Message from Vanna Odonnell sent at 6/26/2017 11:04 AM EDT -----  Patient had a CT chest done last week by pulmonary.  She had it done downstairs and wants to know if Dr. Simental can give her those results because she has heard back from pulm yet.  Patient can be reached at [phone].

## 2017-06-27 ENCOUNTER — TELEPHONE (OUTPATIENT)
Dept: PULMONOLOGY | Facility: CLINIC | Age: 42
End: 2017-06-27

## 2017-06-27 NOTE — TELEPHONE ENCOUNTER
I had a lengthy phone call with Mrs. Ospina discussing her CT results.  There was no evidence of ILD but minimal bronchiectasis.  She had already discussed these findings with her primary care but wanted to hear from pulmonary as well.    We reviewed the bronchiectasis is a chronic condition.  She asked about treatment.  She reports she was sick for almost 12 weeks straight last winter and she would like to avoid that.  We discussed staying on top of respiratory infections and treating them promptly.  She likes to treat her conditions holistically but states that she does understand she may have to take some medication for any exacerbations.  We discussed her allergy medications and controlling her nasal drainage.  We reviewed when to call our office versus went to see primary care.    At her last visit with Dr. Hill, he recommended a yearly follow-up however she would like to know if she can be seen in 6 months instead.  She is aware of Dr. Hill's FPC but will like to see him before then if at all possible.  I let her know that there may not be any appointments available with Dr. Hill prior to his FPC, but she may be seen by one of our other physicians.  She may also call when she feels like she is developing respiratory symptoms and be seen by one of our nurse practitioners.

## 2017-06-28 ENCOUNTER — OFFICE VISIT (OUTPATIENT)
Dept: INTERNAL MEDICINE | Facility: CLINIC | Age: 42
End: 2017-06-28

## 2017-06-28 VITALS
RESPIRATION RATE: 18 BRPM | BODY MASS INDEX: 31.21 KG/M2 | HEART RATE: 60 BPM | DIASTOLIC BLOOD PRESSURE: 84 MMHG | SYSTOLIC BLOOD PRESSURE: 122 MMHG | TEMPERATURE: 98.5 F | WEIGHT: 179 LBS

## 2017-06-28 DIAGNOSIS — J06.9 UPPER RESPIRATORY TRACT INFECTION, UNSPECIFIED TYPE: Primary | ICD-10-CM

## 2017-06-28 DIAGNOSIS — L65.9 HAIR LOSS: ICD-10-CM

## 2017-06-28 DIAGNOSIS — E78.49 OTHER HYPERLIPIDEMIA: ICD-10-CM

## 2017-06-28 DIAGNOSIS — I10 ESSENTIAL HYPERTENSION: ICD-10-CM

## 2017-06-28 DIAGNOSIS — R76.8 POSITIVE ANA (ANTINUCLEAR ANTIBODY): ICD-10-CM

## 2017-06-28 DIAGNOSIS — R73.03 PREDIABETES: ICD-10-CM

## 2017-06-28 PROBLEM — J47.9 BRONCHIECTASIS WITHOUT COMPLICATION (HCC): Status: ACTIVE | Noted: 2017-06-28

## 2017-06-28 LAB
ALBUMIN SERPL-MCNC: 4.3 G/DL (ref 3.2–4.8)
ALBUMIN/GLOB SERPL: 1.7 G/DL (ref 1.5–2.5)
ALP SERPL-CCNC: 53 U/L (ref 25–100)
ALT SERPL W P-5'-P-CCNC: 14 U/L (ref 7–40)
ANION GAP SERPL CALCULATED.3IONS-SCNC: 6 MMOL/L (ref 3–11)
ARTICHOKE IGE QN: 161 MG/DL (ref 0–130)
AST SERPL-CCNC: 17 U/L (ref 0–33)
BILIRUB SERPL-MCNC: 0.8 MG/DL (ref 0.3–1.2)
BUN BLD-MCNC: 19 MG/DL (ref 9–23)
BUN/CREAT SERPL: 21.1 (ref 7–25)
CALCIUM SPEC-SCNC: 9.6 MG/DL (ref 8.7–10.4)
CHLORIDE SERPL-SCNC: 105 MMOL/L (ref 99–109)
CHOLEST SERPL-MCNC: 241 MG/DL (ref 0–200)
CO2 SERPL-SCNC: 28 MMOL/L (ref 20–31)
CREAT BLD-MCNC: 0.9 MG/DL (ref 0.6–1.3)
DEPRECATED RDW RBC AUTO: 40.8 FL (ref 37–54)
ERYTHROCYTE [DISTWIDTH] IN BLOOD BY AUTOMATED COUNT: 12.8 % (ref 11.3–14.5)
EXPIRATION DATE: NORMAL
GFR SERPL CREATININE-BSD FRML MDRD: 69 ML/MIN/1.73
GLOBULIN UR ELPH-MCNC: 2.5 GM/DL
GLUCOSE BLD-MCNC: 103 MG/DL (ref 70–100)
HBA1C MFR BLD: 5.6 %
HCT VFR BLD AUTO: 40.7 % (ref 34.5–44)
HDLC SERPL-MCNC: 40 MG/DL (ref 40–60)
HGB BLD-MCNC: 13.1 G/DL (ref 11.5–15.5)
Lab: NORMAL
MCH RBC QN AUTO: 28.6 PG (ref 27–31)
MCHC RBC AUTO-ENTMCNC: 32.2 G/DL (ref 32–36)
MCV RBC AUTO: 88.9 FL (ref 80–99)
PLATELET # BLD AUTO: 285 10*3/MM3 (ref 150–450)
PMV BLD AUTO: 9.3 FL (ref 6–12)
POTASSIUM BLD-SCNC: 4.1 MMOL/L (ref 3.5–5.5)
PROT SERPL-MCNC: 6.8 G/DL (ref 5.7–8.2)
RBC # BLD AUTO: 4.58 10*6/MM3 (ref 3.89–5.14)
SODIUM BLD-SCNC: 139 MMOL/L (ref 132–146)
T4 FREE SERPL-MCNC: 1.27 NG/DL (ref 0.89–1.76)
TRIGL SERPL-MCNC: 158 MG/DL (ref 0–150)
TSH SERPL DL<=0.05 MIU/L-ACNC: 1.8 MIU/ML (ref 0.35–5.35)
WBC NRBC COR # BLD: 7.73 10*3/MM3 (ref 3.5–10.8)

## 2017-06-28 PROCEDURE — 36415 COLL VENOUS BLD VENIPUNCTURE: CPT | Performed by: INTERNAL MEDICINE

## 2017-06-28 PROCEDURE — 80053 COMPREHEN METABOLIC PANEL: CPT | Performed by: INTERNAL MEDICINE

## 2017-06-28 PROCEDURE — 84439 ASSAY OF FREE THYROXINE: CPT | Performed by: INTERNAL MEDICINE

## 2017-06-28 PROCEDURE — 86038 ANTINUCLEAR ANTIBODIES: CPT | Performed by: INTERNAL MEDICINE

## 2017-06-28 PROCEDURE — 86225 DNA ANTIBODY NATIVE: CPT | Performed by: INTERNAL MEDICINE

## 2017-06-28 PROCEDURE — 85027 COMPLETE CBC AUTOMATED: CPT | Performed by: INTERNAL MEDICINE

## 2017-06-28 PROCEDURE — 80061 LIPID PANEL: CPT | Performed by: INTERNAL MEDICINE

## 2017-06-28 PROCEDURE — 84443 ASSAY THYROID STIM HORMONE: CPT | Performed by: INTERNAL MEDICINE

## 2017-06-28 PROCEDURE — 99214 OFFICE O/P EST MOD 30 MIN: CPT | Performed by: INTERNAL MEDICINE

## 2017-06-28 PROCEDURE — 83036 HEMOGLOBIN GLYCOSYLATED A1C: CPT | Performed by: INTERNAL MEDICINE

## 2017-06-28 RX ORDER — AMOXICILLIN AND CLAVULANATE POTASSIUM 875; 125 MG/1; MG/1
1 TABLET, FILM COATED ORAL 2 TIMES DAILY
Qty: 20 TABLET | Refills: 0 | Status: SHIPPED | OUTPATIENT
Start: 2017-06-28 | End: 2017-07-08

## 2017-06-28 RX ORDER — FLUCONAZOLE 150 MG/1
150 TABLET ORAL ONCE
Qty: 2 TABLET | Refills: 0 | Status: SHIPPED | OUTPATIENT
Start: 2017-06-28 | End: 2018-01-31 | Stop reason: SDUPTHER

## 2017-06-28 RX ORDER — AZELASTINE 1 MG/ML
1 SPRAY, METERED NASAL DAILY PRN
COMMUNITY
End: 2017-10-19

## 2017-06-28 NOTE — PROGRESS NOTES
Subjective       Key Ospina is a 41 y.o. female.     Chief Complaint   Patient presents with   • Follow-up     F/U FROM PULMONOLOGY       History obtained from the patient.      URI    This is a new problem. Episode onset: 3-4 days ago. The problem has been gradually worsening. There has been no fever. Associated symptoms include congestion, coughing (wet and dry, occasionally productive of clear sputum), headaches, joint pain (unchanged), a plugged ear sensation, rhinorrhea (clear), sinus pain and a sore throat. Pertinent negatives include no abdominal pain, chest pain, diarrhea, ear pain, joint swelling, nausea, neck pain, rash, swollen glands, vomiting or wheezing. Treatments tried: Allegra, Flonase, and Astelin. The treatment provided mild relief.      The patient recently had a high definition CT scan of her lungs to evaluate her shortness of breath, specifically in light of the possible SLE diagnosis.  The CT was negative with the exception of mild bronchiectasis in the bases.  The patient states she talked with Dr. Hill's nurse who relayed that he was not concerned at this time, but wanted to watch this closely.  The patient is concerned because Dr. Hill is retiring in September.  The patient has not discussed these findings with her Rheumatologist at [place] yet.    The following portions of the patient's history were reviewed and updated as appropriate: allergies, current medications, past family history, past medical history, past social history, past surgical history and problem list.      Review of Systems   Constitutional: Positive for chills and fatigue (worse). Negative for fever.   HENT: Positive for congestion, postnasal drip, rhinorrhea (clear), sinus pressure, sore throat and voice change. Negative for ear pain.    Eyes: Negative for pain, discharge, redness and itching.   Respiratory: Positive for cough (wet and dry, occasionally productive of clear sputum), chest tightness and  shortness of breath (worse). Negative for wheezing.         Denies hemoptysis.   Cardiovascular: Negative for chest pain.   Gastrointestinal: Negative for abdominal pain, diarrhea, nausea and vomiting.   Endocrine:        Complains of hair loss.     Musculoskeletal: Positive for arthralgias and joint pain (unchanged). Negative for myalgias, neck pain and neck stiffness.   Skin: Negative for rash.   Neurological: Positive for headaches.   Hematological: Negative for adenopathy.         Blood pressure 122/84, pulse 60, temperature 98.5 °F (36.9 °C), temperature source Temporal Artery , resp. rate 18, weight 179 lb (81.2 kg), last menstrual period 06/28/2017, not currently breastfeeding.      Objective     Physical Exam   Constitutional: She appears well-developed and well-nourished.   HENT:   Head: Normocephalic and atraumatic.   Right Ear: Tympanic membrane, external ear and ear canal normal.   Left Ear: Tympanic membrane, external ear and ear canal normal.   Mouth/Throat: Oropharynx is clear and moist and mucous membranes are normal. No oral lesions.   Hoarse voice.  Tonsils absent.  No sinus tenderness to palpation.   Eyes: Conjunctivae are normal.   Neck: Normal range of motion. Neck supple.   Cardiovascular: Normal rate and regular rhythm.    No murmur heard.  Pulmonary/Chest: Effort normal and breath sounds normal.   Lymphadenopathy:     She has no cervical adenopathy.   Skin: No rash noted.   Psychiatric: She has a normal mood and affect.   Nursing note and vitals reviewed.    Counseling was given to patient for the following topics: appropriate exercise, discussed labs and diagnostic tests performed last visit or since last visit, healthy eating habits and risks and benefits of treament options . Total time of the encounter was 25 minutes and 20 minutes was spent counseling.        Lab Results   Component Value Date    HGBA1C 5.6 06/28/2017         Assessment/Plan   Key was seen today for  follow-up.    Diagnoses and all orders for this visit:    Upper respiratory tract infection, unspecified type  -     amoxicillin-clavulanate (AUGMENTIN) 875-125 MG per tablet; Take 1 tablet by mouth 2 (Two) Times a Day for 10 days.  -     fluconazole (DIFLUCAN) 150 MG tablet; Take 1 tablet by mouth 1 (One) Time for 1 dose. Repeat after 4 days    Prediabetes  -     POC Glycosylated Hemoglobin (Hb A1C)    Other hyperlipidemia  -     CBC (No Diff)  -     Lipid Panel  -     Comprehensive Metabolic Panel  -     TSH  -     T4, Free    Essential hypertension    Positive YOKO (antinuclear antibody)  -     YOKO  -     Anti-DNA Antibody, Double-stranded    Hair loss  -     TSH  -     T4, Free      Return in about 3 weeks (around 7/19/2017) for Recheck-Hyperlipidemia, nonfasting.

## 2017-06-29 LAB
ANA SER QL: POSITIVE
DSDNA AB SER-ACNC: 25 IU/ML (ref 0–9)
Lab: NORMAL

## 2017-07-03 ENCOUNTER — TELEPHONE (OUTPATIENT)
Dept: INTERNAL MEDICINE | Facility: CLINIC | Age: 42
End: 2017-07-03

## 2017-07-03 DIAGNOSIS — J06.9 VIRAL UPPER RESPIRATORY TRACT INFECTION: Primary | ICD-10-CM

## 2017-07-03 NOTE — TELEPHONE ENCOUNTER
----- Message from Cindi Jones sent at 7/3/2017  8:22 AM EDT -----  PT WAS SEEN LAST WEEK AND PUT ON AUGMENTIN. SHE WOULD LIKE IT CHANGED, SHE'S NOT TOLERATING IT THIS TIME.     PHARMACY: [facility]    PATIENT: [phone]

## 2017-07-05 RX ORDER — AZITHROMYCIN 250 MG/1
TABLET, FILM COATED ORAL
Qty: 6 TABLET | Refills: 0 | Status: SHIPPED | OUTPATIENT
Start: 2017-07-05 | End: 2017-07-10

## 2017-07-05 NOTE — TELEPHONE ENCOUNTER
Pt stated that she has never had an intolerance to any abx before. Pt thinks it may have triggered her reflux. She stated that she does tolerate a z-ellen.  Pt states that all her symptom have resolved other than coughing up white mucus. She stated that she has been off abx since Friday. Does she need to take any meds? She also stated that she will be seeing her Allergist tomorrow. Please advise

## 2017-07-05 NOTE — TELEPHONE ENCOUNTER
Would recommend the Zpack, # 1 as directed.  Would need this to kill off the more resistant bacteria, since she was unable to complete the full course of Augmentin.

## 2017-07-10 ENCOUNTER — OFFICE VISIT (OUTPATIENT)
Dept: INTERNAL MEDICINE | Facility: CLINIC | Age: 42
End: 2017-07-10

## 2017-07-10 VITALS
DIASTOLIC BLOOD PRESSURE: 80 MMHG | WEIGHT: 178 LBS | BODY MASS INDEX: 31.04 KG/M2 | RESPIRATION RATE: 16 BRPM | HEART RATE: 66 BPM | SYSTOLIC BLOOD PRESSURE: 120 MMHG | TEMPERATURE: 98.2 F

## 2017-07-10 DIAGNOSIS — Z79.899 HIGH RISK MEDICATION USE: ICD-10-CM

## 2017-07-10 DIAGNOSIS — F41.1 GENERALIZED ANXIETY DISORDER: ICD-10-CM

## 2017-07-10 DIAGNOSIS — I10 ESSENTIAL HYPERTENSION: ICD-10-CM

## 2017-07-10 DIAGNOSIS — Z23 NEED FOR PROPHYLACTIC VACCINATION AGAINST STREPTOCOCCUS PNEUMONIAE (PNEUMOCOCCUS): ICD-10-CM

## 2017-07-10 DIAGNOSIS — K21.9 GASTROESOPHAGEAL REFLUX DISEASE WITHOUT ESOPHAGITIS: ICD-10-CM

## 2017-07-10 DIAGNOSIS — E78.49 OTHER HYPERLIPIDEMIA: ICD-10-CM

## 2017-07-10 DIAGNOSIS — R73.03 PREDIABETES: Primary | ICD-10-CM

## 2017-07-10 PROCEDURE — 99214 OFFICE O/P EST MOD 30 MIN: CPT | Performed by: INTERNAL MEDICINE

## 2017-07-10 NOTE — PROGRESS NOTES
Subjective       Key Ospina is a 41 y.o. female.     Chief Complaint   Patient presents with   • Prediabetes       History obtained from the patient.      History of Present Illness     The patient was seen on 6/28/17 for URI.  She was put on Augmentin.  This caused heartburn and reflux, so she was switched to Z-Papa.  She is getting better.  The patient states she saw her allergist recently and was put on Pulmicort.  She has not been able to tolerate it, stating she cannot sleep.  Her allergist told her she would follow her pulmonary issues since Dr. Hill is leaving.  She has allergy testing coming up on 7/14/17.    She has an appointment with a new rheumatologist at the end of the month.  Her recent labs showed her YOKO and dsDNA to be positive again.      Primary Care Cardiac Diagnostic Constellation: The patient is here today for a follow-up visit. Fasting labs were done on 6/28/17.     Her Hyperlipidemia has been unstable.   Her LDL goal is 130 mg/dL, last LDL was 161 mg/dL and .   Medication(s): None. She has taken Crestor and Lipitor in the past and they both caused myalgias.   Her Hypertension has been stable.  Medication (s): None.  Her Prediabetes has been stable.  Medication:  None.     Interval Events: Last HgA1c on 6/28/17 was 5.6.  Of note, on 4/7/10, the patient had a normal Homocysteine level and Cardiac C-reactive protein. Her LDL-particle test did show high risk.      Symptoms:Stable fatigue and ELENA.   Denies chest pain, denies intermittent leg claudication, denies lower extremity edema, denies muscle pain,  and denies muscle weakness.   Associated symptoms:  no recent weight changes.  No palpitations, no syncope, no headache, no orthopnea, no PND, no polydipsia, no polyuria, no focal neurologic deficits, and no memory loss.      Lifestyle and Disease Management: Diet: She consumes a diverse and healthy diet. Weight Issues: She has weight concerns. Exercise: She exercises regularly.  She exercises 6 times per week. Exercise includes aerobic conditioning and strength training.   Smoking: She does not use tobacco.      Gastroesophageal Reflux Disease (Follow-Up): The patient is being seen for follow-up of Gastroesophageal Reflux Disease.   There are no comorbid illnesses.  Interval events: The patient went back on her Dexilant 2 weeks ago due to the heartburn and reflux caused by the antibiotics.  It is improving.   Interval symptoms: Her heartburn is occasional.  She denies chest pain, denies abdominal pain, denies acid regurgitation,  and denies dysphagia.   Associated symptoms: no hoarseness, no nausea, no vomiting, no hematemesis and no melena.   Medications: Dexilant (has been off since June 2016). The patient is adherent to her medication regimen, but she denies medication side effects.     Anxiety Disorder Follow-up: The patient is here for follow-up of Anxiety.  She is doing well.  Symptoms: Occasional anxiety.  No depression, insomnia, panic attacks, or suicidal thoughts.  Occasion: Alprazalom as needed.  She states she takes it approximately 3 times per year, and it has been several months since she last took one.       Current Outpatient Prescriptions on File Prior to Visit   Medication Sig Dispense Refill   • albuterol (PROAIR HFA) 108 (90 BASE) MCG/ACT inhaler Inhale 2 puffs Every 4 (Four) Hours As Needed for Wheezing. 1 inhaler 11   • ALPRAZolam (XANAX) 0.25 MG tablet Take 1 tablet by mouth Daily As Needed (prn). 30 tablet 1   • fexofenadine (ALLEGRA) 180 MG tablet Take 1 tablet by mouth daily.     • fluticasone (FLONASE) 50 MCG/ACT nasal spray 2 sprays into each nostril daily. Administer 1 spray in each nostril twice daily.     • tretinoin (RETIN-A) 0.025 % cream Apply 1 application topically Daily As Needed.     • azelastine (ASTELIN) 0.1 % nasal spray 1 spray into each nostril Daily As Needed for Rhinitis. Use in each nostril as directed     • [DISCONTINUED] azithromycin  (ZITHROMAX) 250 MG tablet Take 2 tablets the first day, then 1 tablet daily for 4 days. 6 tablet 0     No current facility-administered medications on file prior to visit.          The following portions of the patient's history were reviewed and updated as appropriate: allergies, current medications, past family history, past medical history, past social history, past surgical history and problem list.    Review of Systems   Constitutional: Positive for fatigue. Negative for unexpected weight change.   HENT: Positive for congestion.    Eyes: Negative for visual disturbance.   Respiratory: Positive for cough. Negative for shortness of breath and wheezing.    Cardiovascular: Negative for chest pain, palpitations and leg swelling.        Stable ELENA, but no orthopnea or claudication.   Gastrointestinal: Negative for abdominal pain, blood in stool, constipation, diarrhea, nausea and vomiting.        Denies melena.   Endocrine: Negative for polydipsia and polyuria.   Musculoskeletal: Negative for arthralgias and myalgias.   Neurological: Negative for dizziness, syncope, light-headedness and headaches.        No memory issues.   Psychiatric/Behavioral: Negative for decreased concentration, sleep disturbance and suicidal ideas. The patient is nervous/anxious.         Denies depression.         Objective       Blood pressure 120/80, pulse 66, temperature 98.2 °F (36.8 °C), temperature source Temporal Artery , resp. rate 16, weight 178 lb (80.7 kg), last menstrual period 06/28/2017, not currently breastfeeding.      Physical Exam   Constitutional:   Overweight.   Neck: Normal range of motion. Neck supple. Carotid bruit is not present. No thyromegaly present.   Cardiovascular: Normal rate, regular rhythm, normal heart sounds and intact distal pulses.  Exam reveals no gallop and no friction rub.    No murmur heard.  No peripheral edema.   Pulmonary/Chest: Effort normal and breath sounds normal.   Abdominal: Soft. Bowel sounds  are normal. She exhibits no distension, no abdominal bruit and no mass. There is no hepatosplenomegaly. There is no tenderness.   Psychiatric: She has a normal mood and affect.     Counseling was given to patient for the following topics: appropriate exercise, discussed labs and diagnostic tests performed last visit or since last visit, disease prevention, healthy eating habits, pathophysiology of her presenting problem along with plans for any diagnositc work-up and treatment, risks and benefits of treament options, stress increase in the patient's life, symptoms of anxiety and symptoms of depression . Total time of the encounter was 30 minutes and 20 minutes was spent counseling.        Assessment / Plan:    Key was seen today for prediabetes.    Diagnoses and all orders for this visit:    Prediabetes    Essential hypertension    Other hyperlipidemia    Gastroesophageal reflux disease without esophagitis    Generalized anxiety disorder    High risk medication use  -     Urine Drug Screen    Need for prophylactic vaccination against Streptococcus pneumoniae (pneumococcus)  -     Pneumococcal Polysaccharide Vaccine 23-Valent Greater Than or Equal To 1yo Subcutaneous / IM; Future      The patient was instructed in the side effects of the medication.  Risks of the potential for tolerance, dependence, and addiction were discussed.  The patient was instructed to take the lowest dosage of the medication, at the lowest frequency, and for the shortest period of time possible.  The patient was instructed not to receive controlled substances or narcotics from other doctors, and not to giveaway or sell the medication.    The patient was instructed to abstain from illicit drug use.  The patient was instructed to avoid alcohol while taking these medications.      Narcotics/controlled substance agreement, Aroldo report, and Urine Drug Screen were updated today if needed.      Return in about 6 months (around 1/10/2018) for  Recheck-Hyperlipidemia fasting.

## 2017-07-25 ENCOUNTER — TELEPHONE (OUTPATIENT)
Dept: PULMONOLOGY | Facility: CLINIC | Age: 42
End: 2017-07-25

## 2017-07-25 NOTE — TELEPHONE ENCOUNTER
Ms Ospina questioned when she should have a follow up CT chest. She reports that her internist and rheumatologist have recommended follow up scan at a 3-6 month interval. Dr. Hill's last note indicated depending on her pulmonary symptomatology can repeat CT scans as necessary and compare back to her baseline study. Ms Ospina would like a more definitive answer.    I discussed this with Dr. Hill and reviewed our discussion with Ms. Ospina. A 3-6 month scan would add radiation exposure without any benefit. We can obtain a high resolution CT when she returns in June 2018 for her yearly follow up. Ms. Ospina was agreeable.

## 2017-07-31 ENCOUNTER — TELEPHONE (OUTPATIENT)
Dept: INTERNAL MEDICINE | Facility: CLINIC | Age: 42
End: 2017-07-31

## 2017-07-31 DIAGNOSIS — K21.9 GASTROESOPHAGEAL REFLUX DISEASE WITHOUT ESOPHAGITIS: Primary | ICD-10-CM

## 2017-07-31 RX ORDER — DEXLANSOPRAZOLE 60 MG/1
60 CAPSULE, DELAYED RELEASE ORAL DAILY
Qty: 30 CAPSULE | Refills: 11 | Status: SHIPPED | OUTPATIENT
Start: 2017-07-31 | End: 2017-08-30

## 2017-07-31 NOTE — TELEPHONE ENCOUNTER
----- Message from Lenora Moscoso sent at 7/31/2017  2:05 PM EDT -----  Contact: SELF  BEVERLEY THOMASON CALLING TO DISCUSS HER FOLLOW UP WITH HER RHEUMATOLOGIST. SHE CAN BE REACHED -[phone]

## 2017-07-31 NOTE — TELEPHONE ENCOUNTER
Spoke with the patient.  The Rheumatologist is repeating a few lab tests, but told her she probably will recommend taking Plaquenil.  She has a follow up appointment in 8 weeks.      She states she has been trying to decrease her ProAir use, but still feels she needs it.  She is going on vacation 8/4/17-8/11/17.  Told patient ok to continue using the ProAir for now, but to call me when she comes back from vacation to see if there is another maintenance medication she can try.    She also needs a refill for Dexilant.  E-rx'd.

## 2017-08-03 ENCOUNTER — TELEPHONE (OUTPATIENT)
Dept: INTERNAL MEDICINE | Facility: CLINIC | Age: 42
End: 2017-08-03

## 2017-08-03 ENCOUNTER — TELEPHONE (OUTPATIENT)
Dept: PULMONOLOGY | Facility: CLINIC | Age: 42
End: 2017-08-03

## 2017-08-03 NOTE — TELEPHONE ENCOUNTER
Returned call to pt.  LM to return call. Office number given.    Just needs to be scheduled to be seen today.

## 2017-08-03 NOTE — TELEPHONE ENCOUNTER
I received a message from Ms Ospina stating that she has developed an upper respiratory infection. She tried contacting her PCP, but they are out of town and the covering physician was unwilling to prescribe her anything without being seen. She is going out of town for a week this evening and stated she would be unable to make it into the office.     When I called Ms Ospina back she stated that she had already gotten a prescription for Zithromax from a PA she knows.

## 2017-08-03 NOTE — TELEPHONE ENCOUNTER
PT RETURNED CALL.  STATED SHE CAN NOT COME IN TODAY DUE TO GOING OUT OF TOWN IN A FEW HOURS.  SHE WAS VERY UPSET AND WAS CRYING.  ASKED FOR A MESSAGE TO BE SENT TO DR HOWE FOR WHEN SHE RETURNS.  SAID THIS HAS HAPPENED BEFORE WHEN DR HOWE HAS BEEN OUT AND IT REALLY AFFECTS HER CARE.  SHE STATED SHE WOULD CALL HER PULMONOLOGIST TO GET MEDS

## 2017-08-03 NOTE — TELEPHONE ENCOUNTER
----- Message from Vanna Odonnell sent at 8/3/2017  9:16 AM EDT -----  Patient is leaving later today and will be gone for 7 days.  States she has a sinus infection and is now coughing up green stuff and it has moved to her chest.  She was diagnosed with bronchiectasis and was told once she starts coughing up color it has to be treated quickly to keep lungs clear.  Wants antibiotic called to [facility] and patient can be reached at [phone].  Is leaving in a few hours and needs ASAP.

## 2017-08-17 NOTE — TELEPHONE ENCOUNTER
The patient called back.  Discussed her medical issues.  Informed the patient she could call me on her cell phone if she has issues in the future.  She is trying to decide whether to start Placquenil.  She will follow up with the rheumatologist and then with me.

## 2017-10-19 ENCOUNTER — OFFICE VISIT (OUTPATIENT)
Dept: INTERNAL MEDICINE | Facility: CLINIC | Age: 42
End: 2017-10-19

## 2017-10-19 VITALS
DIASTOLIC BLOOD PRESSURE: 70 MMHG | TEMPERATURE: 97 F | SYSTOLIC BLOOD PRESSURE: 120 MMHG | HEART RATE: 72 BPM | BODY MASS INDEX: 30.4 KG/M2 | RESPIRATION RATE: 20 BRPM | WEIGHT: 174.38 LBS

## 2017-10-19 DIAGNOSIS — E78.49 OTHER HYPERLIPIDEMIA: Primary | ICD-10-CM

## 2017-10-19 DIAGNOSIS — I10 ESSENTIAL HYPERTENSION: ICD-10-CM

## 2017-10-19 DIAGNOSIS — J47.9 BRONCHIECTASIS WITHOUT COMPLICATION (HCC): ICD-10-CM

## 2017-10-19 LAB
ARTICHOKE IGE QN: 172 MG/DL (ref 0–130)
CHOLEST SERPL-MCNC: 244 MG/DL (ref 0–200)
HDLC SERPL-MCNC: 43 MG/DL (ref 40–60)
TRIGL SERPL-MCNC: 181 MG/DL (ref 0–150)

## 2017-10-19 PROCEDURE — 99214 OFFICE O/P EST MOD 30 MIN: CPT | Performed by: INTERNAL MEDICINE

## 2017-10-19 PROCEDURE — 80061 LIPID PANEL: CPT | Performed by: INTERNAL MEDICINE

## 2017-10-19 PROCEDURE — 36415 COLL VENOUS BLD VENIPUNCTURE: CPT | Performed by: INTERNAL MEDICINE

## 2017-10-19 RX ORDER — DEXLANSOPRAZOLE 60 MG/1
CAPSULE, DELAYED RELEASE ORAL
Refills: 0 | COMMUNITY
Start: 2017-07-31 | End: 2017-11-02

## 2017-10-19 NOTE — PROGRESS NOTES
"Subjective       Key Ospina is a 42 y.o. female.     Chief Complaint   Patient presents with   • Hyperlipidemia     6 month follow up        History obtained from the patient.      History of Present Illness     The patient states she saw her Rheumatologist on 7/28/17, Dr. Bell.  She felt that the patient's pleurisy was related to her autoimmune disease.  She recommended starting Plaquenil.  The patient opted to to wait on that.  She saw Dr. Bell back on 10/6/17 and labs were ordered.  They're awaiting the results of these labs to determine whether to start the Plaquenil or not.       Primary Care Cardiac Diagnostic [facility]ellation: The patient is here today for a follow-up visit. Fasting labs were done on 6/28/17.      Her Hyperlipidemia has been unstable.   Her LDL goal is 130 mg/dL, last LDL was 161 mg/dL and .   Medication(s): None. She has taken Crestor and Lipitor in the past and they both caused myalgias.   Her Hypertension has been stable.  Medication (s): None.  Her Prediabetes has been stable.  Medication:  None.      Interval Events: Last HgA1c on 6/28/17 was 5.6.  She is seeing a new pulmonologist, Dr Mon on 11/2/17.  Her ELENA has been stable.  She is using her inhaler once per day,  5-6 times per week, usually before exercise.  She tried Pulmicort in the past which \"wired her\".      Symptoms:Stable fatigue and ELENA.   Denies chest pain, denies intermittent leg claudication, denies lower extremity edema, denies muscle pain,  and denies muscle weakness.   Associated symptoms:  no recent significant weight changes.  No palpitations, no syncope, no headache, no orthopnea, no PND, no polydipsia, no polyuria, no focal neurologic deficits, and no memory loss.       Lifestyle and Disease Management: Diet: She consumes a diverse and healthy diet. Weight Issues: She has weight concerns. Exercise: She exercises regularly. She exercises 6 times per week. Exercise includes aerobic " conditioning and strength training.   Smoking: She does not use tobacco.         Current Outpatient Prescriptions on File Prior to Visit   Medication Sig Dispense Refill   • albuterol (PROAIR HFA) 108 (90 BASE) MCG/ACT inhaler Inhale 2 puffs Every 4 (Four) Hours As Needed for Wheezing. 1 inhaler 11   • ALPRAZolam (XANAX) 0.25 MG tablet Take 1 tablet by mouth Daily As Needed (prn). 30 tablet 1   • fexofenadine (ALLEGRA) 180 MG tablet Take 1 tablet by mouth daily.     • fluticasone (FLONASE) 50 MCG/ACT nasal spray 2 sprays into each nostril daily. Administer 1 spray in each nostril twice daily.     • tretinoin (RETIN-A) 0.025 % cream Apply 1 application topically Daily As Needed.     • [DISCONTINUED] azelastine (ASTELIN) 0.1 % nasal spray 1 spray into each nostril Daily As Needed for Rhinitis. Use in each nostril as directed       No current facility-administered medications on file prior to visit.          The following portions of the patient's history were reviewed and updated as appropriate: allergies, current medications, past family history, past medical history, past social history, past surgical history and problem list.    Review of Systems   Constitutional: Positive for fatigue. Negative for unexpected weight change.   Eyes: Negative for visual disturbance.   Respiratory: Positive for cough (occasional). Negative for shortness of breath and wheezing.    Cardiovascular: Negative for chest pain, palpitations and leg swelling.        Stable ELENA, but no orthopnea, or claudication.   Endocrine: Negative for polydipsia and polyuria.   Musculoskeletal: Negative for arthralgias and myalgias.   Neurological: Negative for dizziness, syncope, light-headedness and headaches.        No memory issues.   Psychiatric/Behavioral: Negative for decreased concentration.         Objective       Blood pressure 120/70, pulse 72, temperature 97 °F (36.1 °C), temperature source Temporal Artery , resp. rate 20, weight 174 lb 6 oz  (79.1 kg), not currently breastfeeding.      Physical Exam   Constitutional:   Overweight.   Neck: Normal range of motion. Neck supple. Carotid bruit is not present. No thyromegaly present.   Cardiovascular: Normal rate, regular rhythm, normal heart sounds and intact distal pulses.  Exam reveals no gallop and no friction rub.    No murmur heard.  No peripheral edema.   Pulmonary/Chest: Effort normal and breath sounds normal.   Abdominal: Soft. Bowel sounds are normal. She exhibits no distension, no abdominal bruit and no mass. There is no hepatosplenomegaly. There is no tenderness.   Psychiatric: She has a normal mood and affect.   Nursing note and vitals reviewed.    Counseling was given to patient for the following topics: appropriate exercise, discussed labs and diagnostic tests performed last visit or since last visit, disease prevention, healthy eating habits, risks and benefits of treament options and side effects of medications . Total time of the encounter was 20 minutes and 15 minutes was spent counseling.      Assessment / Plan:    Key was seen today for hyperlipidemia.    Diagnoses and all orders for this visit:    Other hyperlipidemia  -     Lipid Panel    Essential hypertension    Bronchiectasis without complication  -     Tiotropium Bromide Monohydrate (SPIRIVA RESPIMAT) 1.25 MCG/ACT aerosol solution inhaler; Inhale 2 puffs Daily.        The patient opted to wait on the Influenza and Pneumococcal vaccines today (information sheets given).      Return for Next scheduled follow up.

## 2017-10-19 NOTE — PATIENT INSTRUCTIONS
The patient opted to wait on the Influenza and Pneumococcal vaccines today (information sheets given).

## 2017-11-02 ENCOUNTER — OFFICE VISIT (OUTPATIENT)
Dept: PULMONOLOGY | Facility: CLINIC | Age: 42
End: 2017-11-02

## 2017-11-02 VITALS
DIASTOLIC BLOOD PRESSURE: 80 MMHG | SYSTOLIC BLOOD PRESSURE: 124 MMHG | RESPIRATION RATE: 16 BRPM | HEART RATE: 68 BPM | OXYGEN SATURATION: 100 % | WEIGHT: 178.38 LBS | TEMPERATURE: 97.9 F | BODY MASS INDEX: 31.1 KG/M2

## 2017-11-02 DIAGNOSIS — R06.09 DYSPNEA ON EXERTION: ICD-10-CM

## 2017-11-02 DIAGNOSIS — J47.9 BRONCHIECTASIS WITHOUT COMPLICATION (HCC): Primary | ICD-10-CM

## 2017-11-02 DIAGNOSIS — M32.8 OTHER FORMS OF SYSTEMIC LUPUS ERYTHEMATOSUS, UNSPECIFIED ORGAN INVOLVEMENT STATUS (HCC): ICD-10-CM

## 2017-11-02 PROCEDURE — 99215 OFFICE O/P EST HI 40 MIN: CPT | Performed by: INTERNAL MEDICINE

## 2017-11-02 PROCEDURE — 82787 IGG 1 2 3 OR 4 EACH: CPT | Performed by: INTERNAL MEDICINE

## 2017-11-02 PROCEDURE — 82103 ALPHA-1-ANTITRYPSIN TOTAL: CPT | Performed by: INTERNAL MEDICINE

## 2017-11-02 PROCEDURE — 82104 ALPHA-1-ANTITRYPSIN PHENO: CPT | Performed by: INTERNAL MEDICINE

## 2017-11-02 PROCEDURE — 36415 COLL VENOUS BLD VENIPUNCTURE: CPT | Performed by: INTERNAL MEDICINE

## 2017-11-03 PROBLEM — M32.8 OTHER FORMS OF SYSTEMIC LUPUS ERYTHEMATOSUS: Status: ACTIVE | Noted: 2017-11-03

## 2017-11-03 LAB
A1AT SERPL-MCNC: 119 MG/DL (ref 90–200)
IGA SERPL-MCNC: 91 MG/DL (ref 87–352)
IGM SERPL-MCNC: 229 MG/DL (ref 26–217)

## 2017-11-03 NOTE — PROGRESS NOTES
Pulmonary Office Follow Up      Subjective   Chief Complaint: Shortness of Breath    Key Ospina is a 42 y.o. female is being seen in follow up for Dyspnea with a history of Lupus.     History of Present Illness    Ms. Ospina is a 43yo F previously followed by Dr. Hill with a history of recurrent dyspnea and Lupus who returns to clinic for follow up. She was last seen 06/23/17 by Dr. Hill.     She was previously seen for complaints of dyspnea that would come on after recurrent upper respiratory infections (in the winter). She has been evaluated in various places for these symptoms and has actually had CT angiograms that have been negative for PE. She has also seen cardiology at [facility] and has undergone echocardiograms and treadmill studies both of which were unremarkable. Interestingly enough she has had positive serologic studies consistent with a diagnosis of lupus but she does not meet full criteria for lupus (had positive ANAs, as well as double-stranded DNA's). Has undergone rheumatologic evaluation the past and her most recent evaluation by Dr. Luna at [place] [facility] 4/2017 indicates that she is now felt to have lupus. The possibility of treatment with agents such as Plaquenil has been entertained but she does not seem to have end organ damage, so for now therapy is being held. She has been told by her rheumatologist at [facility] to continue follow-up with pulmonary medicine.    Since she was last seen, she notes that her breathing is still not quite back to normal. She is using an albuterol inhaler with exercise approximately 5 times per week. She has not been started on any therapies for Lupus. She recently had a CT scan of the chest performed which showed mild bronchiectasis and she is concerned about this. She tells me that she does feel like she has a lot of mucous production and often has trouble coughing it up. She does not have any wheezing. She denies fever and  chills. Her symptoms mainly occur with exertion and happen intermittently.     The following portions of the patient's history were reviewed and updated as appropriate: allergies, current medications, past family history, past medical history, past social history, past surgical history and problem list.    Review of Systems  All other systems were reviewed and are negative.  Exceptions are noted in the HPI or above.      Objective   Blood pressure 124/80, pulse 68, temperature 97.9 °F (36.6 °C), resp. rate 16, weight 178 lb 6 oz (80.9 kg), SpO2 100 %, not currently breastfeeding.  Physical Exam   Constitutional: She is oriented to person, place, and time. She appears well-developed and well-nourished. No distress.   HENT:   Head: Normocephalic and atraumatic.   Eyes: EOM are normal. Pupils are equal, round, and reactive to light.   Neck: Normal range of motion. Neck supple. No thyromegaly present.   Cardiovascular: Normal rate and regular rhythm.  Exam reveals no friction rub.    No murmur heard.  Pulmonary/Chest: Effort normal and breath sounds normal.   Abdominal: Soft. She exhibits no distension. There is no tenderness.   Musculoskeletal: Normal range of motion. She exhibits no edema.   Neurological: She is alert and oriented to person, place, and time.   Skin: Skin is warm and dry.   Psychiatric: She has a normal mood and affect. Judgment normal.       PFTs:  Last PFTs performed on 1/3/17 showed no obstruction, no restriction and normal DLCO.     Imaging:  High resolution CT scan of the chest performed and personally reviewed. There is no interstitial lung disease present. There is mild bronchiectasis in the lower lobes bilaterally.     Assessment/Plan   Key was seen today for lupus.    Diagnoses and all orders for this visit:    Bronchiectasis without complication  -     Alpha - 1 - Antitrypsin  -     Alpha - 1 - Antitrypsin Phenotype  -     Cystic Fibrosis Gene Test  -     IgG subclasses (1-4)  -      Cancel: IgG  -     IgM  -     IgA  -     OSCILLATING POSITIVE EXIRATORY PRESSURE (OPEP); Future    Dyspnea on exertion    Other forms of systemic lupus erythematosus, unspecified organ involvement status        Discussion:  Ms. Ospina is a 41yo F with a history of lupus and dyspnea on exertion who is followed in clinic to monitor for progression of disease. She does not want to start a lot of medications and prefers to be vigilant in monitoring for progression of Pulmonary disease related to Lupus. She is willing to try a maintenance inhaler for exercise induced asthma.     Plan:  1. Start inhaled corticosteroid. A sample of Arnuity 100mcg was given in clinic today. She will let us know if this is helpful and if she would like a prescription. I have advised her to rinse her mouth after use.   2. Repeat yearly PFTs.   3. Past echocardiograms have been normal. If her DLCO is low, will need to check an echocardiogram.   4. Will check labs for other causes of bronchiectasis.   5. Rx for flutter valve to help with mucous in the setting of bronchiectasis which is mild.        I personally spent over half of a total 45 minutes face to face with the patient in counseling and discussion and/or coordination of care as described above.     Angela Mon, DO  Pulmonary and Critical Care Medicine

## 2017-11-05 LAB
IGG SERPL-MCNC: 683 MG/DL (ref 700–1600)
IGG1 SER-MCNC: 364 MG/DL (ref 248–810)
IGG2 SER-MCNC: 199 MG/DL (ref 130–555)
IGG3 SER-MCNC: 58 MG/DL (ref 15–102)
IGG4 SER-MCNC: <1 MG/DL (ref 2–96)

## 2017-11-07 LAB
A1AT SERPL-MCNC: 118 MG/DL (ref 90–200)
PHENOTYPE: NORMAL

## 2017-11-13 ENCOUNTER — TELEPHONE (OUTPATIENT)
Dept: PULMONOLOGY | Facility: CLINIC | Age: 42
End: 2017-11-13

## 2017-11-13 LAB — CFTR MUT ANL BLD/T: NORMAL

## 2017-11-13 NOTE — TELEPHONE ENCOUNTER
I attempted to call Ms. Ospina to go over her most recent lab work.     Her IgG subclass 4 level is low. I would like her to see an Allergist/Immunologist to discuss whether or not she should receive replacement.

## 2017-12-11 ENCOUNTER — OFFICE VISIT (OUTPATIENT)
Dept: PULMONOLOGY | Facility: CLINIC | Age: 42
End: 2017-12-11

## 2017-12-11 DIAGNOSIS — R06.02 SHORTNESS OF BREATH: Primary | ICD-10-CM

## 2017-12-11 PROCEDURE — 94729 DIFFUSING CAPACITY: CPT | Performed by: INTERNAL MEDICINE

## 2017-12-11 PROCEDURE — 94375 RESPIRATORY FLOW VOLUME LOOP: CPT | Performed by: INTERNAL MEDICINE

## 2017-12-11 PROCEDURE — 94726 PLETHYSMOGRAPHY LUNG VOLUMES: CPT | Performed by: INTERNAL MEDICINE

## 2017-12-15 ENCOUNTER — TELEPHONE (OUTPATIENT)
Dept: INTERNAL MEDICINE | Facility: CLINIC | Age: 42
End: 2017-12-15

## 2017-12-15 NOTE — TELEPHONE ENCOUNTER
----- Message from Judy Couch sent at 12/15/2017  8:36 AM EST -----  [phone]    PT HAS BEEN MORE BREATHING TROUBLE-WANTS TO BE SEEN BY ONLY DR HOWE NEXT WEEK.  SPOKE W/CHELLE-SHE SAID TO GO AHEAD AND WORK PT IN ON Tuesday 19TH AT 8:30, 1:15, OR Wednesday 20TH 8:30     CARLTON FISH

## 2017-12-17 ENCOUNTER — APPOINTMENT (OUTPATIENT)
Dept: GENERAL RADIOLOGY | Facility: HOSPITAL | Age: 42
End: 2017-12-17

## 2017-12-17 ENCOUNTER — HOSPITAL ENCOUNTER (EMERGENCY)
Facility: HOSPITAL | Age: 42
Discharge: HOME | End: 2017-12-17
Attending: EMERGENCY MEDICINE | Admitting: EMERGENCY MEDICINE

## 2017-12-17 VITALS
DIASTOLIC BLOOD PRESSURE: 85 MMHG | TEMPERATURE: 97.9 F | SYSTOLIC BLOOD PRESSURE: 149 MMHG | BODY MASS INDEX: 29.99 KG/M2 | HEART RATE: 67 BPM | HEIGHT: 65 IN | OXYGEN SATURATION: 98 % | RESPIRATION RATE: 18 BRPM | WEIGHT: 180 LBS

## 2017-12-17 DIAGNOSIS — R06.00 DYSPNEA, UNSPECIFIED TYPE: Primary | ICD-10-CM

## 2017-12-17 DIAGNOSIS — R03.0 ELEVATED BLOOD PRESSURE READING: ICD-10-CM

## 2017-12-17 DIAGNOSIS — R07.89 CHEST TIGHTNESS: ICD-10-CM

## 2017-12-17 LAB
ALBUMIN SERPL-MCNC: 4.4 G/DL (ref 3.2–4.8)
ALBUMIN/GLOB SERPL: 1.7 G/DL (ref 1.5–2.5)
ALP SERPL-CCNC: 54 U/L (ref 25–100)
ALT SERPL W P-5'-P-CCNC: 16 U/L (ref 7–40)
ANION GAP SERPL CALCULATED.3IONS-SCNC: 10 MMOL/L (ref 3–11)
AST SERPL-CCNC: 19 U/L (ref 0–33)
B-HCG UR QL: NEGATIVE
BASOPHILS # BLD AUTO: 0.02 10*3/MM3 (ref 0–0.2)
BASOPHILS NFR BLD AUTO: 0.3 % (ref 0–1)
BILIRUB SERPL-MCNC: 0.4 MG/DL (ref 0.3–1.2)
BNP SERPL-MCNC: 34 PG/ML (ref 0–100)
BUN BLD-MCNC: 17 MG/DL (ref 9–23)
BUN/CREAT SERPL: 24.3 (ref 7–25)
CALCIUM SPEC-SCNC: 9.3 MG/DL (ref 8.7–10.4)
CHLORIDE SERPL-SCNC: 106 MMOL/L (ref 99–109)
CO2 SERPL-SCNC: 25 MMOL/L (ref 20–31)
CREAT BLD-MCNC: 0.7 MG/DL (ref 0.6–1.3)
D DIMER PPP FEU-MCNC: 0.36 MG/L (FEU) (ref 0–0.5)
DEPRECATED RDW RBC AUTO: 40.7 FL (ref 37–54)
EOSINOPHIL # BLD AUTO: 0.03 10*3/MM3 (ref 0–0.3)
EOSINOPHIL NFR BLD AUTO: 0.4 % (ref 0–3)
ERYTHROCYTE [DISTWIDTH] IN BLOOD BY AUTOMATED COUNT: 12.8 % (ref 11.3–14.5)
GFR SERPL CREATININE-BSD FRML MDRD: 92 ML/MIN/1.73
GLOBULIN UR ELPH-MCNC: 2.6 GM/DL
GLUCOSE BLD-MCNC: 110 MG/DL (ref 70–100)
HCT VFR BLD AUTO: 40.5 % (ref 34.5–44)
HGB BLD-MCNC: 13 G/DL (ref 11.5–15.5)
HOLD SPECIMEN: NORMAL
HOLD SPECIMEN: NORMAL
IMM GRANULOCYTES # BLD: 0.01 10*3/MM3 (ref 0–0.03)
IMM GRANULOCYTES NFR BLD: 0.1 % (ref 0–0.6)
INTERNAL NEGATIVE CONTROL: REACTIVE
INTERNAL POSITIVE CONTROL: REACTIVE
LYMPHOCYTES # BLD AUTO: 1.66 10*3/MM3 (ref 0.6–4.8)
LYMPHOCYTES NFR BLD AUTO: 22.5 % (ref 24–44)
Lab: NORMAL
MCH RBC QN AUTO: 28.4 PG (ref 27–31)
MCHC RBC AUTO-ENTMCNC: 32.1 G/DL (ref 32–36)
MCV RBC AUTO: 88.6 FL (ref 80–99)
MONOCYTES # BLD AUTO: 0.22 10*3/MM3 (ref 0–1)
MONOCYTES NFR BLD AUTO: 3 % (ref 0–12)
NEUTROPHILS # BLD AUTO: 5.44 10*3/MM3 (ref 1.5–8.3)
NEUTROPHILS NFR BLD AUTO: 73.7 % (ref 41–71)
PLATELET # BLD AUTO: 258 10*3/MM3 (ref 150–450)
PMV BLD AUTO: 9.1 FL (ref 6–12)
POTASSIUM BLD-SCNC: 4.4 MMOL/L (ref 3.5–5.5)
PROT SERPL-MCNC: 7 G/DL (ref 5.7–8.2)
RBC # BLD AUTO: 4.57 10*6/MM3 (ref 3.89–5.14)
SODIUM BLD-SCNC: 141 MMOL/L (ref 132–146)
TROPONIN I SERPL-MCNC: 0 NG/ML (ref 0–0.07)
WBC NRBC COR # BLD: 7.38 10*3/MM3 (ref 3.5–10.8)
WHOLE BLOOD HOLD SPECIMEN: NORMAL
WHOLE BLOOD HOLD SPECIMEN: NORMAL

## 2017-12-17 PROCEDURE — 99284 EMERGENCY DEPT VISIT MOD MDM: CPT

## 2017-12-17 PROCEDURE — 71010 HC CHEST PA OR AP: CPT

## 2017-12-17 PROCEDURE — 85379 FIBRIN DEGRADATION QUANT: CPT | Performed by: EMERGENCY MEDICINE

## 2017-12-17 PROCEDURE — 84484 ASSAY OF TROPONIN QUANT: CPT

## 2017-12-17 PROCEDURE — 83880 ASSAY OF NATRIURETIC PEPTIDE: CPT | Performed by: EMERGENCY MEDICINE

## 2017-12-17 PROCEDURE — 93005 ELECTROCARDIOGRAM TRACING: CPT | Performed by: EMERGENCY MEDICINE

## 2017-12-17 PROCEDURE — 85025 COMPLETE CBC W/AUTO DIFF WBC: CPT | Performed by: EMERGENCY MEDICINE

## 2017-12-17 PROCEDURE — 80053 COMPREHEN METABOLIC PANEL: CPT | Performed by: EMERGENCY MEDICINE

## 2017-12-17 RX ORDER — ALUMINA, MAGNESIA, AND SIMETHICONE 2400; 2400; 240 MG/30ML; MG/30ML; MG/30ML
15 SUSPENSION ORAL ONCE
Status: COMPLETED | OUTPATIENT
Start: 2017-12-17 | End: 2017-12-17

## 2017-12-17 RX ORDER — SODIUM CHLORIDE 0.9 % (FLUSH) 0.9 %
10 SYRINGE (ML) INJECTION AS NEEDED
Status: DISCONTINUED | OUTPATIENT
Start: 2017-12-17 | End: 2017-12-17 | Stop reason: HOSPADM

## 2017-12-17 RX ADMIN — ALUMINUM HYDROXIDE, MAGNESIUM HYDROXIDE, AND DIMETHICONE 15 ML: 400; 400; 40 SUSPENSION ORAL at 09:50

## 2017-12-17 RX ADMIN — LIDOCAINE HYDROCHLORIDE 15 ML: 20 SOLUTION ORAL; TOPICAL at 09:51

## 2017-12-17 NOTE — ED PROVIDER NOTES
Subjective   HPI Comments: Ms. Key Ospina is a 42 y.o. female who presents to the ED with c/o SoA. She reports that for the last 3 days she has had a continuous episode of mild SoA and chest tightness. She states that she has had intermittent similar episodes for the last two years and has been seeing Dr. Mon, pulmonology, about it but they have been unable to find a cause. However, she states that she uses her inhaler regularly but for the last few days she has only been getting mild relief from using her inhaler, instead of full relief. She notes that she has also been feeling like she gets winded more easily than usual, her SoA is better sitting up, and that she usually exercises around 5 times a week. She also complains of a cough, and congestion but she denies a fever. She has a hx of lupus, a negative stress test and echo around 2 years ago when her episodes first started, and a negative chest X-ray in June that showed mild bronchiectasis but was otherwise normal. No other acute complaints at this time.    Patient is a 42 y.o. female presenting with shortness of breath.   History provided by:  Patient  Shortness of Breath   Severity:  Mild  Onset quality:  Gradual  Duration:  4 days  Timing:  Constant  Progression:  Unchanged  Chronicity:  Chronic  Associated symptoms: chest pain and cough    Associated symptoms: no fever        Review of Systems   Constitutional: Negative for fever.   HENT: Positive for congestion.    Respiratory: Positive for cough and shortness of breath.    Cardiovascular: Positive for chest pain.   All other systems reviewed and are negative.      Past Medical History:   Diagnosis Date   • Anemia     History of; fe Deficiency- intermittent   • Breast mass    • ELENA (dyspnea on exertion) 4/27/2016    Neg Cardiac and pulmonary workup   • Elevated EBV antibody titer    • Fatigue    • History of abdominal pain     Left upper quad   • History of anemia    • History of cardiovascular  stress test 2013    Normal treadmill test.  and 9/10- normal exercise echo   • History of chest pain    • History of chest x-ray 2016    Unremarkable   • History of chest x-ray 2013    The lungs are clear. The heart is normal. There is no active disease in the chest.   • History of closed head injury    • History of esophagogastroduodenoscopy 2013    path c/w mild chronic gastritis and esophagitis   • History of Helicobacter infection 2011   • History of infectious mononucleosis     dx 8/10-chronically elevated EBV Titers   • History of influenza 2014   • History of PFTs 2016    Normal spirometry. ? lung volumes. Isolated mild reduction in diffusion capacity of unclear etiology.   • History of PFTs 2016    Normal spirometry. Post bronchodilator test not clearly improved.   • History of varicella    • Myalgia     and myositis    • Palpitations        Allergies   Allergen Reactions   • Augmentin [Amoxicillin-Pot Clavulanate] GI Intolerance     Severe reflux and heartburn   • Latex      Latex exam gloves   • Methylprednisolone      Manic feeling like she is climbing the wall    • Pulmicort [Budesonide] Other (See Comments)     Hyperactivity and Insomnia       Past Surgical History:   Procedure Laterality Date   •  SECTION     • CHOLECYSTECTOMY     • MOUTH SURGERY     • TONSILLECTOMY         Family History   Problem Relation Age of Onset   • Coronary artery disease Mother    • Hyperlipidemia Father    • Hyperlipidemia Paternal Aunt      multiple   • Hyperlipidemia Paternal Uncle      multiple       Social History     Social History   • Marital status:      Spouse name: N/A   • Number of children: N/A   • Years of education: N/A     Social History Main Topics   • Smoking status: Never Smoker   • Smokeless tobacco: Never Used   • Alcohol use No   • Drug use: No   • Sexual activity: Defer     Other Topics Concern   • None     Social History  Narrative   • None         Objective   Physical Exam   Constitutional: She is oriented to person, place, and time. She appears well-developed and well-nourished.  Non-toxic appearance. No distress.   Patient appears mildly anxious but non-toxic.   HENT:   Head: Normocephalic and atraumatic.   Nose: Nose normal.   Eyes: Conjunctivae are normal. No scleral icterus.   Neck: Normal range of motion. Neck supple.   Cardiovascular: Normal rate, regular rhythm and normal heart sounds.    No murmur heard.  Pulmonary/Chest: Effort normal and breath sounds normal. No respiratory distress.   Patient is speaking in full sentences and has no evidence of respiratory distress.   Abdominal: Soft. There is no tenderness.   Musculoskeletal: Normal range of motion. She exhibits no edema or tenderness.   Calves are non-tender and symmetrical. Negative Olamide's sign.   Neurological: She is alert and oriented to person, place, and time.   Skin: Skin is warm and dry. She is not diaphoretic.   Psychiatric: Her behavior is normal. Her mood appears anxious (Mildly anxious).   Nursing note and vitals reviewed.      Procedures         ED Course  ED Course     Recent Results (from the past 24 hour(s))   Comprehensive Metabolic Panel    Collection Time: 12/17/17  9:44 AM   Result Value Ref Range    Glucose 110 (H) 70 - 100 mg/dL    BUN 17 9 - 23 mg/dL    Creatinine 0.70 0.60 - 1.30 mg/dL    Sodium 141 132 - 146 mmol/L    Potassium 4.4 3.5 - 5.5 mmol/L    Chloride 106 99 - 109 mmol/L    CO2 25.0 20.0 - 31.0 mmol/L    Calcium 9.3 8.7 - 10.4 mg/dL    Total Protein 7.0 5.7 - 8.2 g/dL    Albumin 4.40 3.20 - 4.80 g/dL    ALT (SGPT) 16 7 - 40 U/L    AST (SGOT) 19 0 - 33 U/L    Alkaline Phosphatase 54 25 - 100 U/L    Total Bilirubin 0.4 0.3 - 1.2 mg/dL    eGFR Non African Amer 92 >60 mL/min/1.73    Globulin 2.6 gm/dL    A/G Ratio 1.7 1.5 - 2.5 g/dL    BUN/Creatinine Ratio 24.3 7.0 - 25.0    Anion Gap 10.0 3.0 - 11.0 mmol/L   BNP    Collection Time:  12/17/17  9:44 AM   Result Value Ref Range    BNP 34.0 0.0 - 100.0 pg/mL   Light Blue Top    Collection Time: 12/17/17  9:44 AM   Result Value Ref Range    Extra Tube hold for add-on    Green Top (Gel)    Collection Time: 12/17/17  9:44 AM   Result Value Ref Range    Extra Tube Hold for add-ons.    Lavender Top    Collection Time: 12/17/17  9:44 AM   Result Value Ref Range    Extra Tube hold for add-on    Gold Top - SST    Collection Time: 12/17/17  9:44 AM   Result Value Ref Range    Extra Tube Hold for add-ons.    CBC Auto Differential    Collection Time: 12/17/17  9:44 AM   Result Value Ref Range    WBC 7.38 3.50 - 10.80 10*3/mm3    RBC 4.57 3.89 - 5.14 10*6/mm3    Hemoglobin 13.0 11.5 - 15.5 g/dL    Hematocrit 40.5 34.5 - 44.0 %    MCV 88.6 80.0 - 99.0 fL    MCH 28.4 27.0 - 31.0 pg    MCHC 32.1 32.0 - 36.0 g/dL    RDW 12.8 11.3 - 14.5 %    RDW-SD 40.7 37.0 - 54.0 fl    MPV 9.1 6.0 - 12.0 fL    Platelets 258 150 - 450 10*3/mm3    Neutrophil % 73.7 (H) 41.0 - 71.0 %    Lymphocyte % 22.5 (L) 24.0 - 44.0 %    Monocyte % 3.0 0.0 - 12.0 %    Eosinophil % 0.4 0.0 - 3.0 %    Basophil % 0.3 0.0 - 1.0 %    Immature Grans % 0.1 0.0 - 0.6 %    Neutrophils, Absolute 5.44 1.50 - 8.30 10*3/mm3    Lymphocytes, Absolute 1.66 0.60 - 4.80 10*3/mm3    Monocytes, Absolute 0.22 0.00 - 1.00 10*3/mm3    Eosinophils, Absolute 0.03 0.00 - 0.30 10*3/mm3    Basophils, Absolute 0.02 0.00 - 0.20 10*3/mm3    Immature Grans, Absolute 0.01 0.00 - 0.03 10*3/mm3   D-dimer, Quantitative    Collection Time: 12/17/17  9:44 AM   Result Value Ref Range    D-Dimer, Quantitative 0.36 0.00 - 0.50 mg/L (FEU)   POC Troponin, Rapid    Collection Time: 12/17/17  9:47 AM   Result Value Ref Range    Troponin I 0.00 0.00 - 0.07 ng/mL   POCT pregnancy, urine    Collection Time: 12/17/17 10:04 AM   Result Value Ref Range    HCG, Urine, QL Negative Negative    Lot Number [id number]     Internal Positive Control Reactive     Internal Negative Control Reactive       Note: In addition to lab results from this visit, the labs listed above may include labs taken at another facility or during a different encounter within the last 24 hours. Please correlate lab times with ED admission and discharge times for further clarification of the services performed during this visit.    XR Chest 1 View    (Results Pending)     Vitals:    12/17/17 1014 12/17/17 1030 12/17/17 1101 12/17/17 1211   BP:  (!) 136/106 134/95    BP Location:       Patient Position:       Pulse: 66  63 63   Resp:       Temp:       TempSrc:       SpO2: 100%  100% 98%   Weight:       Height:         Medications   sodium chloride 0.9 % flush 10 mL (not administered)   aluminum-magnesium hydroxide-simethicone (MAALOX MAX) 400-400-40 MG/5ML suspension 15 mL (15 mL Oral Given 12/17/17 0950)   lidocaine viscous (XYLOCAINE) 2 % mouth solution 15 mL (15 mL Mouth/Throat Given 12/17/17 0951)     ECG/EMG Results (last 24 hours)     Procedure Component Value Units Date/Time    ECG 12 Lead [[id number]] Collected:  12/17/17 0908     Updated:  12/17/17 0939    Narrative:       Test Reason : SOA Protocol  Blood Pressure : **/** mmHG  Vent. Rate : 061 BPM     Atrial Rate : 061 BPM     P-R Int : 180 ms          QRS Dur : 078 ms      QT Int : 416 ms       P-R-T Axes : 047 004 034 degrees     QTc Int : 418 ms    Normal sinus rhythm  No previous ECGs available  Confirmed by ANDREAS ARREOLA MD ([id number]) on 12/17/2017 9:39:23 AM    Referred By:  ED MD           Confirmed By:ANDREAS ARREOLA MD                          [facility]    Final diagnoses:   Dyspnea, unspecified type   Chest tightness   Elevated blood pressure reading       Documentation assistance provided by ashvin Barrett.  Information recorded by the scribe was done at my direction and has been verified and validated by me.     Rakel Barrett  12/17/17 0901       Rakel Barrett  12/17/17 1239       Andreas Arreola MD  12/18/17 4053

## 2017-12-17 NOTE — DISCHARGE INSTRUCTIONS
Follow-up with your cardiologist at , next available.  Discuss whether repeat stress testing or other studies would be indicated.        Please review the medications you are supposed to be taking according to prior physician recommendations. I have not changed your home medications during this visit. If your discharge instructions indicate that I have changed your home medications, this is not the case, and you should disregard. If you have any questions about the medication you should be taking at home, please call your physician.

## 2017-12-19 ENCOUNTER — TELEPHONE (OUTPATIENT)
Dept: INTERNAL MEDICINE | Facility: CLINIC | Age: 42
End: 2017-12-19

## 2017-12-19 DIAGNOSIS — M32.8 OTHER FORMS OF SYSTEMIC LUPUS ERYTHEMATOSUS, UNSPECIFIED ORGAN INVOLVEMENT STATUS (HCC): ICD-10-CM

## 2017-12-19 DIAGNOSIS — M32.19 OTHER SYSTEMIC LUPUS ERYTHEMATOSUS WITH OTHER ORGAN INVOLVEMENT (HCC): ICD-10-CM

## 2017-12-19 DIAGNOSIS — R06.09 DYSPNEA ON EXERTION: Primary | ICD-10-CM

## 2017-12-19 NOTE — TELEPHONE ENCOUNTER
Spoke the patient.  She was seen at [facility] ED for an episode of shortness of breath, although she had previously been doing quite well.  They felt some of her symptoms were due to reflux and esophageal irritation.  She has started on Prilosec twice daily and her symptoms have improved.  She states the ER doctor recommended a repeat echocardiogram since it has been 2 years since her last one.  She asked that I review her records from the ED and call her back with my recommendation.

## 2017-12-19 NOTE — TELEPHONE ENCOUNTER
----- Message from Mely Manrique MA sent at 12/19/2017 10:00 AM EST -----  Pt requesting call back from Dr. Simental, refused to say why, said it's private.    Seen in ER [place] @ [facility]    [phone]

## 2017-12-20 ENCOUNTER — TELEPHONE (OUTPATIENT)
Dept: INTERNAL MEDICINE | Facility: CLINIC | Age: 42
End: 2017-12-20

## 2017-12-20 RX ORDER — PANTOPRAZOLE SODIUM 40 MG/1
40 TABLET, DELAYED RELEASE ORAL DAILY
Qty: 30 TABLET | Refills: 5 | Status: SHIPPED | OUTPATIENT
Start: 2017-12-20 | End: 2018-06-24 | Stop reason: SDUPTHER

## 2017-12-20 NOTE — TELEPHONE ENCOUNTER
She states the prilosec is not helping her and she has tried dexilant in the past and did not help    She is wondering if she could try protonix     She is wondering if she has reviewed ER records  If Dr Simental thinks she needs to do the Echo she would like to have it done next week . Explained it may not be possible due to the holidays .And she would consider having it done at [facility] also   She has an appt end of January with Cardiologist at

## 2017-12-20 NOTE — TELEPHONE ENCOUNTER
Since she has had a worsening recently of her shortness of breath, I think it is a good idea to get this done.

## 2017-12-20 NOTE — TELEPHONE ENCOUNTER
----- Message from Mely Manrique MA sent at 12/19/2017 10:00 AM EST -----  Pt requesting call back from Dr. Simental, refused to say why, said it's private.    Seen in ER [place] @ [facility]    [phone]

## 2017-12-20 NOTE — TELEPHONE ENCOUNTER
Patient notified, verbal understanding given.     Patient is wondering if you're concerned regarding the echo and that's why you'd like it to be done or if you want it done for reassurance.    Patient gave OK to leave message on voicemail if she does not answer.    Protonix sent in to pharmacy.

## 2017-12-20 NOTE — TELEPHONE ENCOUNTER
Ok to call in Protonix 40 mg, 1 po daily, # 30 with 5 refill.    ED records reviewed.  EKG and CXR were normal.  Labs were normal.  She had a negative cardiolite stress test in July 2013 at .  Also had a negative treadmill test 2/3/16.  Echo done at  1/15/16, read by Jose Guadalupe Thakkar MD.    Inform the patient I would recommend scheduling an echo.  Order entered.  Will try  first, then [facility].  Please forward message to Yarelis with request to try to schedule for next week.

## 2017-12-21 ENCOUNTER — TELEPHONE (OUTPATIENT)
Dept: INTERNAL MEDICINE | Facility: CLINIC | Age: 42
End: 2017-12-21

## 2017-12-21 RX ORDER — ALPRAZOLAM 0.25 MG/1
0.25 TABLET ORAL DAILY PRN
Qty: 30 TABLET | Refills: 2 | OUTPATIENT
Start: 2017-12-21 | End: 2019-03-14 | Stop reason: SDUPTHER

## 2017-12-21 NOTE — TELEPHONE ENCOUNTER
I do not see order for this, can you enter this order? I will try scheduling at , if they can't get her in, I will try [facility].

## 2017-12-21 NOTE — TELEPHONE ENCOUNTER
----- Message from Judy Couch sent at 2017  9:08 AM EST -----  [phone]    NEEDS REFILL OF ALPRAZOLAM-HAS REFILLS LEFT BUT THEY HAVE     ARSH FISH

## 2018-01-02 ENCOUNTER — TELEPHONE (OUTPATIENT)
Dept: INTERNAL MEDICINE | Facility: CLINIC | Age: 43
End: 2018-01-02

## 2018-01-02 DIAGNOSIS — H10.89 OTHER CONJUNCTIVITIS, UNSPECIFIED LATERALITY: Primary | ICD-10-CM

## 2018-01-02 RX ORDER — SULFACETAMIDE SODIUM 100 MG/ML
2 SOLUTION/ DROPS OPHTHALMIC
Qty: 15 ML | Refills: 1 | Status: SHIPPED | OUTPATIENT
Start: 2018-01-02 | End: 2019-03-14

## 2018-01-02 NOTE — TELEPHONE ENCOUNTER
----- Message from Lenora Moscoso sent at 1/2/2018 10:12 AM EST -----  Contact: SELF  BEVERLEY THOMASON CALLING WITH [facility], SHE WANTS TO KNOW IF YOU WOULD CALL HER IN SOMETHING FOR THIS. SHE USES THE [facility] ON [address]. BEVERLEY CAN BE REACHED [phone]

## 2018-01-11 ENCOUNTER — OFFICE VISIT (OUTPATIENT)
Dept: PULMONOLOGY | Facility: CLINIC | Age: 43
End: 2018-01-11

## 2018-01-11 VITALS
HEART RATE: 61 BPM | OXYGEN SATURATION: 97 % | RESPIRATION RATE: 16 BRPM | BODY MASS INDEX: 30.32 KG/M2 | WEIGHT: 182 LBS | DIASTOLIC BLOOD PRESSURE: 70 MMHG | TEMPERATURE: 98.1 F | SYSTOLIC BLOOD PRESSURE: 116 MMHG | HEIGHT: 65 IN

## 2018-01-11 DIAGNOSIS — K21.9 GASTROESOPHAGEAL REFLUX DISEASE WITHOUT ESOPHAGITIS: Primary | ICD-10-CM

## 2018-01-11 DIAGNOSIS — R06.09 DYSPNEA ON EXERTION: ICD-10-CM

## 2018-01-11 DIAGNOSIS — M32.8 OTHER FORMS OF SYSTEMIC LUPUS ERYTHEMATOSUS, UNSPECIFIED ORGAN INVOLVEMENT STATUS (HCC): ICD-10-CM

## 2018-01-11 DIAGNOSIS — J47.9 BRONCHIECTASIS WITHOUT COMPLICATION (HCC): ICD-10-CM

## 2018-01-11 PROCEDURE — 99215 OFFICE O/P EST HI 40 MIN: CPT | Performed by: INTERNAL MEDICINE

## 2018-01-11 NOTE — PROGRESS NOTES
Pulmonary Office Follow Up      Subjective   Chief Complaint: Shortness of Breath    Key Ospina is a 42 y.o. female is being seen in follow up for Asthma    History of Present Illness    Ms. Ospina is a 43yo F previously followed by Dr. Hill with a history of recurrent dyspnea and Lupus who returns to clinic for follow up. She was last seen 11/2/17 by myself.      She was previously seen for complaints of dyspnea that would come on after recurrent upper respiratory infections (in the winter). She has been evaluated in various places for these symptoms and has actually had CT angiograms that have been negative for PE. She has also seen cardiology at [facility] and has undergone echocardiograms and treadmill studies both of which were unremarkable. Interestingly enough she has had positive serologic studies consistent with a diagnosis of lupus but she does not meet full criteria for lupus (had positive ANAs, as well as double-stranded DNA's). Has undergone rheumatologic evaluation the past and is now thought to have Lupus. The possibility of treatment with agents such as Plaquenil has been entertained but she does not seem to have end organ damage, so for now therapy is being held.     Since she was last seen, she had to go to the ED on 12/17 for severe shortness of breath. At that time, she had been using her rescue inhaler 3x daily or more. In the ED, it was felt that she had esophageal spasm and anxiety causing her symptoms. She was given a GI cocktail and started on a PPI. Since her ED visit, she has felt better. She is using her Albuterol inhaler at least once daily but not nearly as much as she was before. She was given a sample of Arnuity 100mcg but she has not tried this. She has also had a repeat echocardiogram performed at  but she does not know the results. She is scheduled to follow up with Dr. Narvaez later this month. She also has noted intermittent pain in the backs of her  "legs. It does not occur at any specific time and does not happen with exertion. She thinks that her right calf sometimes swells but it does not have any erythema. She has also noticed that sometimes her veins \"look more blue\" than they should.     The following portions of the patient's history were reviewed and updated as appropriate: allergies, current medications, past family history, past medical history, past social history, past surgical history and problem list.    Review of Systems   Constitutional: Negative.    HENT: Negative.    Eyes: Negative.    Respiratory: Positive for shortness of breath.    Cardiovascular: Negative.    Gastrointestinal: Negative.    Endocrine: Negative.    Genitourinary: Negative.    Musculoskeletal: Positive for myalgias.   Skin: Negative.    Allergic/Immunologic: Negative.    Neurological: Negative.    Hematological: Negative.    Psychiatric/Behavioral: The patient is nervous/anxious.          Objective   Blood pressure 116/70, pulse 61, temperature 98.1 °F (36.7 °C), resp. rate 16, height 165.1 cm (65\"), weight 82.6 kg (182 lb), SpO2 97 %, not currently breastfeeding.  Physical Exam   Constitutional: She is oriented to person, place, and time. She appears well-developed and well-nourished. No distress.   HENT:   Head: Normocephalic and atraumatic.   Mouth/Throat: Oropharynx is clear and moist.   Eyes: Conjunctivae are normal. Pupils are equal, round, and reactive to light. No scleral icterus.   Neck: Normal range of motion. Neck supple. No tracheal deviation present. No thyromegaly present.   Cardiovascular: Normal rate, regular rhythm and normal heart sounds.    Pulmonary/Chest: Effort normal and breath sounds normal. No respiratory distress.   Abdominal: Soft. Bowel sounds are normal. There is no tenderness.   Musculoskeletal: Normal range of motion. She exhibits no edema.   Lymphadenopathy:     She has no cervical adenopathy.   Neurological: She is alert and oriented to person, " place, and time. She exhibits normal muscle tone. Coordination normal.   Skin: Skin is warm and dry. No rash noted. No erythema.   Psychiatric: She has a normal mood and affect. Her speech is normal and behavior is normal. Judgment normal.   Nursing note and vitals reviewed.      PFTs:  Recent PFTs are reviewed.   She has no airway obstruction. Her FEV1 is increased when compared to previous.   She has no restriction.   DLCO is normal.     Imaging:  Her recent CXR from 12/17/17 was personally reviewed. There heart is normal size. The lung fields are clear bilaterally. There is no acute cardiopulmonary process.     Assessment/Plan   Key was seen today for shortness of breath.    Diagnoses and all orders for this visit:    Gastroesophageal reflux disease without esophagitis    Bronchiectasis without complication    Dyspnea on exertion    Other forms of systemic lupus erythematosus, unspecified organ involvement status      Discussion:  Ms. Ospina is a 41yo F with a history of lupus and dyspnea on exertion who is followed in clinic to monitor for progression of disease. She does not want to start a lot of medications and prefers to be vigilant in monitoring for progression of Pulmonary disease related to Lupus. She is willing to try a maintenance inhaler for exercise induced asthma.      Plan:  1. Start Arnuity 100mcg. She understands that she should be on a maintenance inhaler if she is using Albuterol more than once daily.   2. She will have the results of her most recent echocardiogram at  sent to us  3. She will follow up with her Allergist regarding the need for IgG4 replacement. If she cannot get an answer from her Allergist, we will refer her to Dr. Paul at .    4. She will start using her Flutter valve and will bring it with her to her next visit to ensure that she is using it correctly.   5. Continue PPI.   6. If she develops symptoms in the interim, she will call for a sick visit. She  understands that she will likely see one of our nurse practitioners.          This was an extended visit.   I personally spent over half of a total 45 minutes face to face with the patient in counseling and discussion and/or coordination of care as described above.     Angela Mon, DO  Pulmonary and Critical Care Medicine

## 2018-01-17 ENCOUNTER — PRIOR AUTHORIZATION (OUTPATIENT)
Dept: INTERNAL MEDICINE | Facility: CLINIC | Age: 43
End: 2018-01-17

## 2018-01-18 ENCOUNTER — TELEPHONE (OUTPATIENT)
Dept: INTERNAL MEDICINE | Facility: CLINIC | Age: 43
End: 2018-01-18

## 2018-01-18 NOTE — TELEPHONE ENCOUNTER
Andreina notified PA for pantaprazole was approved and faxed to [facility]   Verb understanding given

## 2018-01-31 DIAGNOSIS — J06.9 UPPER RESPIRATORY TRACT INFECTION, UNSPECIFIED TYPE: ICD-10-CM

## 2018-02-01 RX ORDER — FLUCONAZOLE 150 MG/1
TABLET ORAL
Qty: 1 TABLET | Refills: 0 | Status: SHIPPED | OUTPATIENT
Start: 2018-02-01 | End: 2019-03-14

## 2018-06-25 RX ORDER — PANTOPRAZOLE SODIUM 40 MG/1
TABLET, DELAYED RELEASE ORAL
Qty: 30 TABLET | Refills: 4 | Status: SHIPPED | OUTPATIENT
Start: 2018-06-25 | End: 2018-12-21 | Stop reason: SDUPTHER

## 2018-08-22 ENCOUNTER — TELEPHONE (OUTPATIENT)
Dept: INTERNAL MEDICINE | Facility: CLINIC | Age: 43
End: 2018-08-22

## 2018-08-22 NOTE — TELEPHONE ENCOUNTER
----- Message from Jojo Bay RN sent at 8/22/2018 10:47 AM EDT -----  PT REQUESTS COPY OF HER TB SKIN TEST RESULTS FROM 3/3/17 BE MAILED TO HER HOME ADDRESS IN CHART,  ADDRESS WAS VERIFIED TO BE CORRECT.

## 2018-11-09 ENCOUNTER — TELEPHONE (OUTPATIENT)
Dept: INTERNAL MEDICINE | Facility: CLINIC | Age: 43
End: 2018-11-09

## 2018-11-09 NOTE — TELEPHONE ENCOUNTER
----- Message from Nusrat Dey sent at 11/8/2018  3:31 PM EST -----  PATIENT RETURNED YOUR CALL.     PHONE: [phone]    THANK YOU.

## 2018-12-21 RX ORDER — PANTOPRAZOLE SODIUM 40 MG/1
TABLET, DELAYED RELEASE ORAL
Qty: 30 TABLET | Refills: 3 | Status: SHIPPED | OUTPATIENT
Start: 2018-12-21 | End: 2019-03-25 | Stop reason: SDUPTHER

## 2019-01-21 ENCOUNTER — TELEPHONE (OUTPATIENT)
Dept: INTERNAL MEDICINE | Facility: CLINIC | Age: 44
End: 2019-01-21

## 2019-01-21 NOTE — TELEPHONE ENCOUNTER
----- Message from Ariana Santiago sent at 1/21/2019  2:08 PM EST -----  PATIENT STATES SHE WOULD LIKE AN UPDATE ON THE PA PROCESS FORpantoprazole (PROTONIX) 40 MG EC tablet. SHE CAN BE REACHED -[phone].

## 2019-01-21 NOTE — TELEPHONE ENCOUNTER
Key notified . She will check with the pharmacy as the PA should cover the medication that they paid for today.  Notified her have them rerun the rx for reimbursement     Verb understanding given

## 2019-01-21 NOTE — TELEPHONE ENCOUNTER
Patient states she will get reimbursed if the pa form states 1/21/19 on it, is there anyway this can be done? She can be reached at [phone].

## 2019-01-21 NOTE — TELEPHONE ENCOUNTER
MARIS to return call  Office # given   PA approved . He can call the pharmacy and have them re-run the rx and it should go thru now.

## 2019-01-22 ENCOUNTER — PRIOR AUTHORIZATION (OUTPATIENT)
Dept: INTERNAL MEDICINE | Facility: CLINIC | Age: 44
End: 2019-01-22

## 2019-01-22 NOTE — TELEPHONE ENCOUNTER
1/22/2019  Pa approved for Pantaprazole 40 mg daily     DX K21.9    Tried and failed dexilant nexium prevacid omeprazole    Mark [id number]     Key notified .

## 2019-03-14 ENCOUNTER — OFFICE VISIT (OUTPATIENT)
Dept: INTERNAL MEDICINE | Facility: CLINIC | Age: 44
End: 2019-03-14

## 2019-03-14 VITALS
BODY MASS INDEX: 32.27 KG/M2 | WEIGHT: 189 LBS | RESPIRATION RATE: 20 BRPM | SYSTOLIC BLOOD PRESSURE: 124 MMHG | DIASTOLIC BLOOD PRESSURE: 70 MMHG | HEIGHT: 64 IN | HEART RATE: 72 BPM | TEMPERATURE: 97.4 F

## 2019-03-14 DIAGNOSIS — I10 ESSENTIAL HYPERTENSION: ICD-10-CM

## 2019-03-14 DIAGNOSIS — Z00.00 ENCOUNTER FOR HEALTH MAINTENANCE EXAMINATION IN ADULT: Primary | ICD-10-CM

## 2019-03-14 DIAGNOSIS — R73.03 PREDIABETES: ICD-10-CM

## 2019-03-14 DIAGNOSIS — E78.49 OTHER HYPERLIPIDEMIA: ICD-10-CM

## 2019-03-14 DIAGNOSIS — Z79.899 HIGH RISK MEDICATION USE: ICD-10-CM

## 2019-03-14 DIAGNOSIS — F41.1 GENERALIZED ANXIETY DISORDER: ICD-10-CM

## 2019-03-14 DIAGNOSIS — K21.9 GASTROESOPHAGEAL REFLUX DISEASE WITHOUT ESOPHAGITIS: ICD-10-CM

## 2019-03-14 LAB
25(OH)D3 SERPL-MCNC: 30.3 NG/ML
ALBUMIN SERPL-MCNC: 4.53 G/DL (ref 3.2–4.8)
ALBUMIN/GLOB SERPL: 2.3 G/DL (ref 1.5–2.5)
ALP SERPL-CCNC: 58 U/L (ref 25–100)
ALT SERPL W P-5'-P-CCNC: 13 U/L (ref 7–40)
ANION GAP SERPL CALCULATED.3IONS-SCNC: 9 MMOL/L (ref 3–11)
ARTICHOKE IGE QN: 162 MG/DL (ref 0–130)
AST SERPL-CCNC: 17 U/L (ref 0–33)
BASOPHILS # BLD AUTO: 0.03 10*3/MM3 (ref 0–0.2)
BASOPHILS NFR BLD AUTO: 0.5 % (ref 0–1)
BILIRUB SERPL-MCNC: 0.4 MG/DL (ref 0.3–1.2)
BUN BLD-MCNC: 16 MG/DL (ref 9–23)
BUN/CREAT SERPL: 18.8 (ref 7–25)
CALCIUM SPEC-SCNC: 9.6 MG/DL (ref 8.7–10.4)
CHLORIDE SERPL-SCNC: 104 MMOL/L (ref 99–109)
CHOLEST SERPL-MCNC: 229 MG/DL (ref 0–200)
CO2 SERPL-SCNC: 26 MMOL/L (ref 20–31)
CREAT BLD-MCNC: 0.85 MG/DL (ref 0.6–1.3)
DEPRECATED RDW RBC AUTO: 39.5 FL (ref 37–54)
EOSINOPHIL # BLD AUTO: 0.06 10*3/MM3 (ref 0–0.3)
EOSINOPHIL NFR BLD AUTO: 0.9 % (ref 0–3)
ERYTHROCYTE [DISTWIDTH] IN BLOOD BY AUTOMATED COUNT: 13.1 % (ref 11.3–14.5)
EXPIRATION DATE: NORMAL
GFR SERPL CREATININE-BSD FRML MDRD: 73 ML/MIN/1.73
GLOBULIN UR ELPH-MCNC: 2 GM/DL
GLUCOSE BLD-MCNC: 102 MG/DL (ref 70–100)
HBA1C MFR BLD: 5.9 %
HCT VFR BLD AUTO: 38.6 % (ref 34.5–44)
HDLC SERPL-MCNC: 42 MG/DL (ref 40–60)
HGB BLD-MCNC: 12.1 G/DL (ref 11.5–15.5)
IMM GRANULOCYTES # BLD AUTO: 0.01 10*3/MM3 (ref 0–0.05)
IMM GRANULOCYTES NFR BLD AUTO: 0.2 % (ref 0–0.6)
LYMPHOCYTES # BLD AUTO: 2.07 10*3/MM3 (ref 0.6–4.8)
LYMPHOCYTES NFR BLD AUTO: 31.3 % (ref 24–44)
Lab: NORMAL
MAGNESIUM SERPL-MCNC: 1.9 MG/DL (ref 1.3–2.7)
MCH RBC QN AUTO: 26.4 PG (ref 27–31)
MCHC RBC AUTO-ENTMCNC: 31.3 G/DL (ref 32–36)
MCV RBC AUTO: 84.1 FL (ref 80–99)
MONOCYTES # BLD AUTO: 0.29 10*3/MM3 (ref 0–1)
MONOCYTES NFR BLD AUTO: 4.4 % (ref 0–12)
NEUTROPHILS # BLD AUTO: 4.17 10*3/MM3 (ref 1.5–8.3)
NEUTROPHILS NFR BLD AUTO: 62.9 % (ref 41–71)
PLATELET # BLD AUTO: 327 10*3/MM3 (ref 150–450)
PMV BLD AUTO: 9.3 FL (ref 6–12)
POTASSIUM BLD-SCNC: 4.2 MMOL/L (ref 3.5–5.5)
PROT SERPL-MCNC: 6.5 G/DL (ref 5.7–8.2)
RBC # BLD AUTO: 4.59 10*6/MM3 (ref 3.89–5.14)
SODIUM BLD-SCNC: 139 MMOL/L (ref 132–146)
TRIGL SERPL-MCNC: 154 MG/DL (ref 0–150)
TSH SERPL DL<=0.05 MIU/L-ACNC: 2.2 MIU/ML (ref 0.35–5.35)
WBC NRBC COR # BLD: 6.62 10*3/MM3 (ref 3.5–10.8)

## 2019-03-14 PROCEDURE — 83735 ASSAY OF MAGNESIUM: CPT | Performed by: INTERNAL MEDICINE

## 2019-03-14 PROCEDURE — 36415 COLL VENOUS BLD VENIPUNCTURE: CPT | Performed by: INTERNAL MEDICINE

## 2019-03-14 PROCEDURE — 80061 LIPID PANEL: CPT | Performed by: INTERNAL MEDICINE

## 2019-03-14 PROCEDURE — 85025 COMPLETE CBC W/AUTO DIFF WBC: CPT | Performed by: INTERNAL MEDICINE

## 2019-03-14 PROCEDURE — 83036 HEMOGLOBIN GLYCOSYLATED A1C: CPT | Performed by: INTERNAL MEDICINE

## 2019-03-14 PROCEDURE — 99396 PREV VISIT EST AGE 40-64: CPT | Performed by: INTERNAL MEDICINE

## 2019-03-14 PROCEDURE — 82306 VITAMIN D 25 HYDROXY: CPT | Performed by: INTERNAL MEDICINE

## 2019-03-14 PROCEDURE — 84443 ASSAY THYROID STIM HORMONE: CPT | Performed by: INTERNAL MEDICINE

## 2019-03-14 PROCEDURE — 80053 COMPREHEN METABOLIC PANEL: CPT | Performed by: INTERNAL MEDICINE

## 2019-03-14 RX ORDER — ALPRAZOLAM 0.25 MG/1
0.25 TABLET ORAL DAILY PRN
Qty: 20 TABLET | Refills: 1 | Status: SHIPPED | OUTPATIENT
Start: 2019-03-14 | End: 2020-07-16 | Stop reason: SDUPTHER

## 2019-03-14 NOTE — PATIENT INSTRUCTIONS
Recommend Influenza, Pneumovax,  and Hepatitis A vaccines.  Please call back to to schedule an immunization appointment at your convenience.    Health Maintenance, Female  Adopting a healthy lifestyle and getting preventive care can go a long way to promote health and wellness. Talk with your health care provider about what schedule of regular examinations is right for you. This is a good chance for you to check in with your provider about disease prevention and staying healthy.  In between checkups, there are plenty of things you can do on your own. Experts have done a lot of research about which lifestyle changes and preventive measures are most likely to keep you healthy. Ask your health care provider for more information.  Weight and diet  Eat a healthy diet  · Be sure to include plenty of vegetables, fruits, low-fat dairy products, and lean protein.  · Do not eat a lot of foods high in solid fats, added sugars, or salt.  · Get regular exercise. This is one of the most important things you can do for your health.  ? Most adults should exercise for at least 150 minutes each week. The exercise should increase your heart rate and make you sweat (moderate-intensity exercise).  ? Most adults should also do strengthening exercises at least twice a week. This is in addition to the moderate-intensity exercise.    Maintain a healthy weight  · Body mass index (BMI) is a measurement that can be used to identify possible weight problems. It estimates body fat based on height and weight. Your health care provider can help determine your BMI and help you achieve or maintain a healthy weight.  · For females 20 years of age and older:  ? A BMI below 18.5 is considered underweight.  ? A BMI of 18.5 to 24.9 is normal.  ? A BMI of 25 to 29.9 is considered overweight.  ? A BMI of 30 and above is considered obese.    Watch levels of cholesterol and blood lipids  · You should start having your blood tested for lipids and cholesterol  at 20 years of age, then have this test every 5 years.  · You may need to have your cholesterol levels checked more often if:  ? Your lipid or cholesterol levels are high.  ? You are older than 50 years of age.  ? You are at high risk for heart disease.    Cancer screening  Lung Cancer  · Lung cancer screening is recommended for adults 55-80 years old who are at high risk for lung cancer because of a history of smoking.  · A yearly low-dose CT scan of the lungs is recommended for people who:  ? Currently smoke.  ? Have quit within the past 15 years.  ? Have at least a 30-pack-year history of smoking. A pack year is smoking an average of one pack of cigarettes a day for 1 year.  · Yearly screening should continue until it has been 15 years since you quit.  · Yearly screening should stop if you develop a health problem that would prevent you from having lung cancer treatment.    Breast Cancer  · Practice breast self-awareness. This means understanding how your breasts normally appear and feel.  · It also means doing regular breast self-exams. Let your health care provider know about any changes, no matter how small.  · If you are in your 20s or 30s, you should have a clinical breast exam (CBE) by a health care provider every 1-3 years as part of a regular health exam.  · If you are 40 or older, have a CBE every year. Also consider having a breast X-ray (mammogram) every year.  · If you have a family history of breast cancer, talk to your health care provider about genetic screening.  · If you are at high risk for breast cancer, talk to your health care provider about having an MRI and a mammogram every year.  · Breast cancer gene (BRCA) assessment is recommended for women who have family members with BRCA-related cancers. BRCA-related cancers include:  ? Breast.  ? Ovarian.  ? Tubal.  ? Peritoneal cancers.  · Results of the assessment will determine the need for genetic counseling and BRCA1 and BRCA2  testing.    Cervical Cancer  Your health care provider may recommend that you be screened regularly for cancer of the pelvic organs (ovaries, uterus, and vagina). This screening involves a pelvic examination, including checking for microscopic changes to the surface of your cervix (Pap test). You may be encouraged to have this screening done every 3 years, beginning at age 21.  · For women ages 30-65, health care providers may recommend pelvic exams and Pap testing every 3 years, or they may recommend the Pap and pelvic exam, combined with testing for human papilloma virus (HPV), every 5 years. Some types of HPV increase your risk of cervical cancer. Testing for HPV may also be done on women of any age with unclear Pap test results.  · Other health care providers may not recommend any screening for nonpregnant women who are considered low risk for pelvic cancer and who do not have symptoms. Ask your health care provider if a screening pelvic exam is right for you.  · If you have had past treatment for cervical cancer or a condition that could lead to cancer, you need Pap tests and screening for cancer for at least 20 years after your treatment. If Pap tests have been discontinued, your risk factors (such as having a new sexual partner) need to be reassessed to determine if screening should resume. Some women have medical problems that increase the chance of getting cervical cancer. In these cases, your health care provider may recommend more frequent screening and Pap tests.    Colorectal Cancer  · This type of cancer can be detected and often prevented.  · Routine colorectal cancer screening usually begins at 50 years of age and continues through 75 years of age.  · Your health care provider may recommend screening at an earlier age if you have risk factors for colon cancer.  · Your health care provider may also recommend using home test kits to check for hidden blood in the stool.  · A small camera at the end of a  tube can be used to examine your colon directly (sigmoidoscopy or colonoscopy). This is done to check for the earliest forms of colorectal cancer.  · Routine screening usually begins at age 50.  · Direct examination of the colon should be repeated every 5-10 years through 75 years of age. However, you may need to be screened more often if early forms of precancerous polyps or small growths are found.    Skin Cancer  · Check your skin from head to toe regularly.  · Tell your health care provider about any new moles or changes in moles, especially if there is a change in a mole's shape or color.  · Also tell your health care provider if you have a mole that is larger than the size of a pencil eraser.  · Always use sunscreen. Apply sunscreen liberally and repeatedly throughout the day.  · Protect yourself by wearing long sleeves, pants, a wide-brimmed hat, and sunglasses whenever you are outside.    Heart disease, diabetes, and high blood pressure  · High blood pressure causes heart disease and increases the risk of stroke. High blood pressure is more likely to develop in:  ? People who have blood pressure in the high end of the normal range (130-139/85-89 mm Hg).  ? People who are overweight or obese.  ? People who are .  · If you are 18-39 years of age, have your blood pressure checked every 3-5 years. If you are 40 years of age or older, have your blood pressure checked every year. You should have your blood pressure measured twice--once when you are at a hospital or clinic, and once when you are not at a hospital or clinic. Record the average of the two measurements. To check your blood pressure when you are not at a hospital or clinic, you can use:  ? An automated blood pressure machine at a pharmacy.  ? A home blood pressure monitor.  · If you are between 55 years and 79 years old, ask your health care provider if you should take aspirin to prevent strokes.  · Have regular diabetes screenings.  This involves taking a blood sample to check your fasting blood sugar level.  ? If you are at a normal weight and have a low risk for diabetes, have this test once every three years after 45 years of age.  ? If you are overweight and have a high risk for diabetes, consider being tested at a younger age or more often.  Preventing infection  Hepatitis B  · If you have a higher risk for hepatitis B, you should be screened for this virus. You are considered at high risk for hepatitis B if:  ? You were born in a country where hepatitis B is common. Ask your health care provider which countries are considered high risk.  ? Your parents were born in a high-risk country, and you have not been immunized against hepatitis B (hepatitis B vaccine).  ? You have HIV or AIDS.  ? You use needles to inject street drugs.  ? You live with someone who has hepatitis B.  ? You have had sex with someone who has hepatitis B.  ? You get hemodialysis treatment.  ? You take certain medicines for conditions, including cancer, organ transplantation, and autoimmune conditions.    Hepatitis C  · Blood testing is recommended for:  ? Everyone born from 1945 through 1965.  ? Anyone with known risk factors for hepatitis C.    Sexually transmitted infections (STIs)  · You should be screened for sexually transmitted infections (STIs) including gonorrhea and chlamydia if:  ? You are sexually active and are younger than 24 years of age.  ? You are older than 24 years of age and your health care provider tells you that you are at risk for this type of infection.  ? Your sexual activity has changed since you were last screened and you are at an increased risk for chlamydia or gonorrhea. Ask your health care provider if you are at risk.  · If you do not have HIV, but are at risk, it may be recommended that you take a prescription medicine daily to prevent HIV infection. This is called pre-exposure prophylaxis (PrEP). You are considered at risk if:  ? You  are sexually active and do not regularly use condoms or know the HIV status of your partner(s).  ? You take drugs by injection.  ? You are sexually active with a partner who has HIV.    Talk with your health care provider about whether you are at high risk of being infected with HIV. If you choose to begin PrEP, you should first be tested for HIV. You should then be tested every 3 months for as long as you are taking PrEP.  Pregnancy  · If you are premenopausal and you may become pregnant, ask your health care provider about preconception counseling.  · If you may become pregnant, take 400 to 800 micrograms (mcg) of folic acid every day.  · If you want to prevent pregnancy, talk to your health care provider about birth control (contraception).  Osteoporosis and menopause  · Osteoporosis is a disease in which the bones lose minerals and strength with aging. This can result in serious bone fractures. Your risk for osteoporosis can be identified using a bone density scan.  · If you are 65 years of age or older, or if you are at risk for osteoporosis and fractures, ask your health care provider if you should be screened.  · Ask your health care provider whether you should take a calcium or vitamin D supplement to lower your risk for osteoporosis.  · Menopause may have certain physical symptoms and risks.  · Hormone replacement therapy may reduce some of these symptoms and risks.  Talk to your health care provider about whether hormone replacement therapy is right for you.  Follow these instructions at home:  · Schedule regular health, dental, and eye exams.  · Stay current with your immunizations.  · Do not use any tobacco products including cigarettes, chewing tobacco, or electronic cigarettes.  · If you are pregnant, do not drink alcohol.  · If you are breastfeeding, limit how much and how often you drink alcohol.  · Limit alcohol intake to no more than 1 drink per day for nonpregnant women. One drink equals 12 ounces  of beer, 5 ounces of wine, or 1½ ounces of hard liquor.  · Do not use street drugs.  · Do not share needles.  · Ask your health care provider for help if you need support or information about quitting drugs.  · Tell your health care provider if you often feel depressed.  · Tell your health care provider if you have ever been abused or do not feel safe at home.  This information is not intended to replace advice given to you by your health care provider. Make sure you discuss any questions you have with your health care provider.  Document Released: 07/02/2012 Document Revised: 05/25/2017 Document Reviewed: 09/20/2016  [employer] Patient Education © 2019 [employer]

## 2019-03-14 NOTE — PROGRESS NOTES
Subjective     Chief Complaint:  Physical Exam.    History of Present Illness    History obtained from the patient.    The patient states she saw her Rheumatologist, Dr. Bell, in the fall 2018 for her positive YOKO and probable SLE.  The patient states her labs were stable.  Plaquenil was again recommended, but the patient declined.     The patient has not seen her Pulmonologist, Dr. Mon  for > 1 year.  Her Bronchiectasis has been stable on prn ProAir MDI.  She is now on an as needed follow-up basis.        Primary Care Cardiac Diagnostic [facility]: The patient is here today for a follow-up visit.      Her Hyperlipidemia has been unstable.   Her LDL goal is 130 mg/dL, last LDL was 172 mg/dL and .   Medication(s): None. She has taken Crestor and Lipitor in the past and they both caused myalgias.   Her Hypertension has been stable.  Medication (s): None.  Her Prediabetes has been stable.  Medication:  None.      Interval Events: Last HgA1c on 6/28/17 was 5.6.        Symptoms: Denies chest pain, shortness of breath, ELENA, orthopnea, PND, palpitations, syncope, lower extremity edema, intermittent leg claudication, lightheadedness, and dizziness.  Associated symptoms: Weight up 5 pounds since last visit (last visit weight not correct per patient).  Has stable myalgias and arthralgias.  No fatigue, headache, polydipsia, polyuria, memory loss, concentration problems, or  focal neurologic deficits, and no memory loss.       Lifestyle and Disease Management: Diet: She consumes a diverse and healthy diet. Weight Issues: She has weight concerns. Exercise: She exercises regularly. She exercises 5 times per week,  Jazzercise and walking.  Smoking: She does not use tobacco.          Gastroesophageal Reflux Disease Follow-Up: The patient is being seen for follow-up of Gastroesophageal Reflux Disease, which is stable.   Comorbid Illnesses:  None.  Interval Events: None.  Symptoms: Has occasional heartburn and  acid reflux.    She denies abdominal pain, nausea, vomiting, dysphagia, odynophagia, melena, hematochezia, early satiety, belching, and bloating..   Associated symptoms: no chronic sore throat, hoarseness, cough, or wheezing  Medications:  Protonix.  The patient is adherent to her medication regimen, but she denies medication side effects.      Anxiety Disorder Follow-up: The patient is here for follow-up of Anxiety disorder, which is stable.  Interval Events: Her anxiety is worse when she has PMS.  Symptoms: Occasional anxiety.  No depression, insomnia, panic attacks, or suicidal thoughts.  Medication:   Alprazalom as needed.  She has been taking it approximately 3 times per month, on average, but last dose was greater than 1 month ago.    Key Ospina is a 43 y.o. female who presents for an Annual Physical.      PMH, PSH, SocHx, FamHx, Allergies, and Medications: Reviewed and updated.    Outpatient Medications Prior to Visit   Medication Sig Dispense Refill   • fexofenadine (ALLEGRA) 180 MG tablet Take 1 tablet by mouth daily.     • fluticasone (FLONASE) 50 MCG/ACT nasal spray 2 sprays into each nostril daily. Administer 1 spray in each nostril twice daily.     • pantoprazole (PROTONIX) 40 MG EC tablet TAKE ONE TABLET BY MOUTH DAILY 30 tablet 3   • ALPRAZolam (XANAX) 0.25 MG tablet Take 1 tablet by mouth Daily As Needed (prn). 30 tablet 2   • tretinoin (RETIN-A) 0.025 % cream Apply 1 application topically Daily As Needed.     • PROAIR  (90 Base) MCG/ACT inhaler INHALE TWO PUFFS BY MOUTH EVERY 4 TO 6 HOURS AS NEEDED FOR COUGH AND FOR SHORTNESS OF BREATH 1 inhaler 10   • albuterol (PROAIR HFA) 108 (90 BASE) MCG/ACT inhaler Inhale 2 puffs Every 4 (Four) Hours As Needed for Wheezing. 1 inhaler 11   • fluconazole (DIFLUCAN) 150 MG tablet TAKE 1 TABLET FOR ONE DOSE. REPEAT AFTER 4 DAYS. 1 tablet 0   • sulfacetamide (BLEPH-10) 10 % ophthalmic solution Administer 2 drops to both eyes Every 3 (Three) Hours. 15  mL 1     No facility-administered medications prior to visit.        Immunization History   Administered Date(s) Administered   • Flu Mist 10/25/2013   • PPD Test 03/03/2017   • Td 05/26/2005   • Tdap 03/02/2013         Patient Active Problem List   Diagnosis   • Allergic rhinitis   • Dyspnea on exertion   • Elevated EBV antibody titer quantitative   • Gastroesophageal reflux disease   • Hyperlipidemia   • Hypertension   • Prediabetes   • Anxiety disorder   • Positive YOKO (antinuclear antibody)   • Bronchiectasis without complication (CMS/HCC)   • Other forms of systemic lupus erythematosus (CMS/HCC)       Health Habits:  Dental Exam. up to date  Eye Exam. up to date  Hearing Loss:  No  Exercise: 5 times/week.  Current exercise activities include: walking and Jazzercise  Diet: Healthy  Multivitamin: No    Safe Driving:  Yes  Seat Belt:  Yes  Bike Helmet:  Yes  Skin Screening:  Yes  Sunscreen: Yes  SBE / CLAU: Yes, every few months  Sexual Activity:  Yes  Birth Control:  Vasectomy  STD Prevention:  N/A    Last Pap: February 2019, normal per patient (Dr. Delaney)  Last Mammogram: 1/11/16, category 2 (she has a Mammogram scheduled in May).  Last DEXA Scan: N/A  Last Colonoscopy: 5/16/13, normal.  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   • Occupation:      Comment: part time   Tobacco Use   • Smoking status: Former Smoker     Packs/day: 1.00     Years: 2.00     Pack years: 2.00     Types: Cigarettes     Last  attempt to quit: 1998     Years since quittin.2   • Smokeless tobacco: Never Used   Substance and Sexual Activity   • Alcohol use: No     Comment: glass of wine or a cocktail, once every 4-6 months   • Drug use: No   • Sexual activity: Yes     Partners: Male     Birth control/protection: Partner's vasectomy   Other Topics Concern   • Not on file   Social History Narrative   • Not on file         Current Medical Providers:    Cynthia Simental MD (Internal Medicine / Pediatrics)    The [facility] providers who are involved in the care of this patient are listed above.         Review of Systems   Constitutional: Positive for unexpected weight change. Negative for chills, fatigue and fever.        Has occasional night sweats.  No generalized pain.   HENT: Positive for congestion (allergies, takes Allegra and Flonase) and rhinorrhea (clear with allergies, takes Allegra and Flonase). Negative for ear pain, hearing loss, nosebleeds, postnasal drip, sinus pressure, sinus pain, sneezing, sore throat, tinnitus and voice change.         Denies snoring.  Complains of dry mouth.   Eyes: Negative for photophobia, pain, discharge, redness, itching and visual disturbance.   Respiratory: Positive for cough (occasional, productive of clear white sputum) and chest tightness ( with anxiety). Negative for shortness of breath, wheezing and stridor.         No orthopnea, dyspnea on exertion, or PND.  No chest congestion.   No  hemoptysis.   Cardiovascular: Negative for chest pain, palpitations and leg swelling.        No claudication or syncope.   Gastrointestinal: Negative for abdominal pain, blood in stool, constipation, diarrhea, nausea, rectal pain and vomiting.        Has occasional heartburn.  No hematemesis.  No dysphagia or odynophagia.  No belching or bloating.  No melena.   Endocrine: Positive for cold intolerance. Negative for heat intolerance, polydipsia, polyphagia and polyuria.        Has chronic hair loss, but no  "dry skin.  No generalized weakness.  No hot flashes.   Genitourinary: Positive for menstrual problem (has PMS, cycles are heavy, regular, but now a little more frequnt). Negative for difficulty urinating, dyspareunia, dysuria, flank pain, frequency, hematuria, pelvic pain, urgency, vaginal bleeding and vaginal discharge.        No nocturia, incomplete emptying, or incontinence.   Musculoskeletal: Positive for arthralgias, joint swelling (intermittent) and myalgias. Negative for back pain, gait problem, neck pain and neck stiffness.        Intermittent joint stiffness.   Skin: Positive for rash (intermittent face and neck).        No new skin lesions or changes in skin lesions. No breast pain or masses.  No nipple discharge or nipple inversion.   Neurological: Negative for dizziness, tremors, syncope, speech difficulty, weakness, light-headedness, numbness and headaches.        No tingling.  No memory loss.  No decreased concentration.   Hematological: Negative for adenopathy. Does not bruise/bleed easily.   Psychiatric/Behavioral: Negative for confusion, sleep disturbance and suicidal ideas. The patient is nervous/anxious.         No depression.           Objective     Vitals:    03/14/19 0837   BP: 124/70   BP Location: Right arm   Pulse: 72   Resp: 20   Temp: 97.4 °F (36.3 °C)   TempSrc: Temporal   Weight: 85.7 kg (189 lb)   Height: 162.6 cm (64\")       Body mass index is 32.44 kg/m².    Physical Exam   Constitutional:   Obese.   HENT:   Head: Normocephalic and atraumatic.   Right Ear: Tympanic membrane, external ear and ear canal normal.   Left Ear: Tympanic membrane, external ear and ear canal normal.   Mouth/Throat: Oropharynx is clear and moist.   Tonsils absent.   Eyes: Conjunctivae and EOM are normal. Pupils are equal, round, and reactive to light.   Fundi normal bilaterally.   Neck: Normal range of motion. Neck supple. Carotid bruit is not present. No thyromegaly present.   Cardiovascular: Normal rate, " regular rhythm, normal heart sounds and intact distal pulses. Exam reveals no gallop and no friction rub.   No murmur heard.  Pulmonary/Chest: Effort normal and breath sounds normal. Right breast exhibits no inverted nipple, no mass, no nipple discharge, no skin change and no tenderness. Left breast exhibits no inverted nipple, no mass, no nipple discharge, no skin change and no tenderness.   Abdominal: Soft. Bowel sounds are normal. She exhibits no distension, no abdominal bruit and no mass. There is no hepatosplenomegaly. There is no tenderness.   Genitourinary:   Genitourinary Comments:  and rectal exam deferred.   Musculoskeletal: Normal range of motion.   Lymphadenopathy:     She has no cervical adenopathy.     She has no axillary adenopathy.        Right: No inguinal and no supraclavicular adenopathy present.        Left: No inguinal and no supraclavicular adenopathy present.   Neurological: She is alert. She has normal strength and normal reflexes. No cranial nerve deficit.   Skin: No rash noted.   No atypical skin lesions.   Psychiatric: She has a normal mood and affect.   Nursing note and vitals reviewed.    Counseling was given to patient for the following topics: appropriate exercise, healthy eating habits, disease prevention, risk factors for cancer, importance of self breast exam and breast health, importance of immunizations, including risks and benefits, bone health, sun safety, seatbelt use and safe driving.  Written information provided to patient on these topics and other health maintenance issues.      Lab Results   Component Value Date    HGBA1C 5.9 03/14/2019       Assessment/Plan       Key was seen today for annual exam.    Diagnoses and all orders for this visit:    Encounter for health maintenance examination in adult  -     Lipid Panel  -     Comprehensive Metabolic Panel  -     TSH  -     Vitamin D 25 Hydroxy  -     CBC & Differential  -     Magnesium  -     POC Glycosylated  Hemoglobin (Hb A1C)  -     CBC Auto Differential    Other hyperlipidemia  -     Lipid Panel  -     Comprehensive Metabolic Panel  -     TSH    Essential hypertension  -     CBC & Differential    Prediabetes  -     POC Glycosylated Hemoglobin (Hb A1C)    Gastroesophageal reflux disease without esophagitis    Generalized anxiety disorder  -     ALPRAZolam (XANAX) 0.25 MG tablet; Take 1 tablet by mouth Daily As Needed (prn).    High risk medication use  -     Magnesium  -     Urine Drug Screen - Urine, Clean Catch; Future    Other orders  -     tretinoin (RETIN-A) 0.025 % cream; Apply 1 application topically to the appropriate area as directed Daily As Needed (prn).      Recommend Influenza, Pneumovax,  and Hepatitis A vaccines.  Please call back to to schedule an immunization appointment at your convenience.  The patient declined the Influenza vaccine.  She was informed she can die from Influenza infection.      The patient was instructed in the side effects of the medication.  Risks of the potential for tolerance, dependence, and addiction were discussed.  The patient was instructed to take the lowest dosage of the medication, at the lowest frequency, and for the shortest period of time possible.  The patient was instructed not to receive controlled substances or narcotics from other doctors, and not to giveaway or sell the medication.    The patient was instructed to abstain from illicit drug use.  The patient was instructed to avoid alcohol while taking these medications.      Narcotics/controlled substance agreement, Aroldo report, and Urine Drug Screen were updated today if needed.      Return in about 6 months (around 9/14/2019) for Recheck Hyperlipidemia, fasting.

## 2019-03-25 RX ORDER — PANTOPRAZOLE SODIUM 40 MG/1
TABLET, DELAYED RELEASE ORAL
Qty: 30 TABLET | Refills: 11 | Status: SHIPPED | OUTPATIENT
Start: 2019-03-25 | End: 2020-04-20

## 2019-03-26 RX ORDER — PANTOPRAZOLE SODIUM 40 MG/1
TABLET, DELAYED RELEASE ORAL
Qty: 27 TABLET | Refills: 2 | OUTPATIENT
Start: 2019-03-26

## 2019-03-27 ENCOUNTER — TELEPHONE (OUTPATIENT)
Dept: INTERNAL MEDICINE | Facility: CLINIC | Age: 44
End: 2019-03-27

## 2019-04-15 NOTE — TELEPHONE ENCOUNTER
Pt stated she wasn't sure if she should send them or not because she didn't hear back. She is going to have her  fax forms today or tomorrow. Once rcvd I will place in your inbox. Pt thanked our office and we ended the call.

## 2019-04-15 NOTE — TELEPHONE ENCOUNTER
Pt called back, she states forms will be faxed tomorrow. She states the specific request is for extended time 25% at 25% and also bathroom breaks without time penalty due to the lupus and anxiety. Pt states you can call her anytime if you have questions for her as well.

## 2019-04-26 ENCOUNTER — TELEPHONE (OUTPATIENT)
Dept: INTERNAL MEDICINE | Facility: CLINIC | Age: 44
End: 2019-04-26

## 2019-04-26 NOTE — TELEPHONE ENCOUNTER
The patient called back.  I explained the form that she had dropped off needs to be filled out by a different type of professional.  She states she will look into it and see if she can track down the form for just a minor accommodation.

## 2019-04-26 NOTE — TELEPHONE ENCOUNTER
Forms received for accommodation.  Attempted to fill these out, but some questions arose.  Called patient and left a message for her to call me back.

## 2019-07-08 ENCOUNTER — HOSPITAL ENCOUNTER (EMERGENCY)
Facility: HOSPITAL | Age: 44
Discharge: HOME | End: 2019-07-08
Attending: EMERGENCY MEDICINE | Admitting: EMERGENCY MEDICINE

## 2019-07-08 ENCOUNTER — APPOINTMENT (OUTPATIENT)
Dept: GENERAL RADIOLOGY | Facility: HOSPITAL | Age: 44
End: 2019-07-08

## 2019-07-08 VITALS
WEIGHT: 185 LBS | RESPIRATION RATE: 16 BRPM | TEMPERATURE: 97.9 F | HEIGHT: 65 IN | SYSTOLIC BLOOD PRESSURE: 135 MMHG | OXYGEN SATURATION: 97 % | DIASTOLIC BLOOD PRESSURE: 99 MMHG | BODY MASS INDEX: 30.82 KG/M2 | HEART RATE: 62 BPM

## 2019-07-08 DIAGNOSIS — R00.2 PALPITATIONS: Primary | ICD-10-CM

## 2019-07-08 LAB
ALBUMIN SERPL-MCNC: 4 G/DL (ref 3.5–5.2)
ALBUMIN/GLOB SERPL: 1.5 G/DL
ALP SERPL-CCNC: 55 U/L (ref 39–117)
ALT SERPL W P-5'-P-CCNC: 18 U/L (ref 1–33)
ANION GAP SERPL CALCULATED.3IONS-SCNC: 12 MMOL/L (ref 5–15)
AST SERPL-CCNC: 20 U/L (ref 1–32)
BASOPHILS # BLD AUTO: 0.04 10*3/MM3 (ref 0–0.2)
BASOPHILS NFR BLD AUTO: 0.4 % (ref 0–1.5)
BILIRUB SERPL-MCNC: <0.2 MG/DL (ref 0.2–1.2)
BUN BLD-MCNC: 20 MG/DL (ref 6–20)
BUN/CREAT SERPL: 24.4 (ref 7–25)
CALCIUM SPEC-SCNC: 8.8 MG/DL (ref 8.6–10.5)
CHLORIDE SERPL-SCNC: 102 MMOL/L (ref 98–107)
CO2 SERPL-SCNC: 23 MMOL/L (ref 22–29)
CREAT BLD-MCNC: 0.82 MG/DL (ref 0.57–1)
DEPRECATED RDW RBC AUTO: 40.4 FL (ref 37–54)
EOSINOPHIL # BLD AUTO: 0.14 10*3/MM3 (ref 0–0.4)
EOSINOPHIL NFR BLD AUTO: 1.4 % (ref 0.3–6.2)
ERYTHROCYTE [DISTWIDTH] IN BLOOD BY AUTOMATED COUNT: 13.5 % (ref 12.3–15.4)
GFR SERPL CREATININE-BSD FRML MDRD: 76 ML/MIN/1.73
GLOBULIN UR ELPH-MCNC: 2.6 GM/DL
GLUCOSE BLD-MCNC: 133 MG/DL (ref 65–99)
HCT VFR BLD AUTO: 35.3 % (ref 34–46.6)
HGB BLD-MCNC: 11 G/DL (ref 12–15.9)
HOLD SPECIMEN: NORMAL
HOLD SPECIMEN: NORMAL
IMM GRANULOCYTES # BLD AUTO: 0.03 10*3/MM3 (ref 0–0.05)
IMM GRANULOCYTES NFR BLD AUTO: 0.3 % (ref 0–0.5)
LYMPHOCYTES # BLD AUTO: 3.09 10*3/MM3 (ref 0.7–3.1)
LYMPHOCYTES NFR BLD AUTO: 31.7 % (ref 19.6–45.3)
MAGNESIUM SERPL-MCNC: 1.8 MG/DL (ref 1.6–2.6)
MCH RBC QN AUTO: 25.7 PG (ref 26.6–33)
MCHC RBC AUTO-ENTMCNC: 31.2 G/DL (ref 31.5–35.7)
MCV RBC AUTO: 82.5 FL (ref 79–97)
MONOCYTES # BLD AUTO: 0.44 10*3/MM3 (ref 0.1–0.9)
MONOCYTES NFR BLD AUTO: 4.5 % (ref 5–12)
NEUTROPHILS # BLD AUTO: 6.02 10*3/MM3 (ref 1.7–7)
NEUTROPHILS NFR BLD AUTO: 61.7 % (ref 42.7–76)
NRBC BLD AUTO-RTO: 0 /100 WBC (ref 0–0.2)
NT-PROBNP SERPL-MCNC: 44.7 PG/ML (ref 5–450)
PLATELET # BLD AUTO: 316 10*3/MM3 (ref 140–450)
PMV BLD AUTO: 8.9 FL (ref 6–12)
POTASSIUM BLD-SCNC: 3.8 MMOL/L (ref 3.5–5.2)
PROT SERPL-MCNC: 6.6 G/DL (ref 6–8.5)
RBC # BLD AUTO: 4.28 10*6/MM3 (ref 3.77–5.28)
SODIUM BLD-SCNC: 137 MMOL/L (ref 136–145)
TROPONIN T SERPL-MCNC: <0.01 NG/ML (ref 0–0.03)
TSH SERPL DL<=0.05 MIU/L-ACNC: 1.97 MIU/ML (ref 0.27–4.2)
WBC NRBC COR # BLD: 9.76 10*3/MM3 (ref 3.4–10.8)
WHOLE BLOOD HOLD SPECIMEN: NORMAL
WHOLE BLOOD HOLD SPECIMEN: NORMAL

## 2019-07-08 PROCEDURE — 83880 ASSAY OF NATRIURETIC PEPTIDE: CPT | Performed by: EMERGENCY MEDICINE

## 2019-07-08 PROCEDURE — 80053 COMPREHEN METABOLIC PANEL: CPT | Performed by: EMERGENCY MEDICINE

## 2019-07-08 PROCEDURE — 85025 COMPLETE CBC W/AUTO DIFF WBC: CPT | Performed by: EMERGENCY MEDICINE

## 2019-07-08 PROCEDURE — 93005 ELECTROCARDIOGRAM TRACING: CPT | Performed by: EMERGENCY MEDICINE

## 2019-07-08 PROCEDURE — 84443 ASSAY THYROID STIM HORMONE: CPT | Performed by: EMERGENCY MEDICINE

## 2019-07-08 PROCEDURE — 71045 X-RAY EXAM CHEST 1 VIEW: CPT

## 2019-07-08 PROCEDURE — 84484 ASSAY OF TROPONIN QUANT: CPT | Performed by: EMERGENCY MEDICINE

## 2019-07-08 PROCEDURE — 99284 EMERGENCY DEPT VISIT MOD MDM: CPT

## 2019-07-08 PROCEDURE — 93005 ELECTROCARDIOGRAM TRACING: CPT

## 2019-07-08 PROCEDURE — 83735 ASSAY OF MAGNESIUM: CPT | Performed by: EMERGENCY MEDICINE

## 2019-07-08 RX ORDER — AMOXICILLIN 875 MG/1
875 TABLET, COATED ORAL 2 TIMES DAILY
Qty: 16 TABLET | Refills: 0 | Status: SHIPPED | OUTPATIENT
Start: 2019-07-08 | End: 2019-07-16

## 2019-07-08 RX ORDER — SODIUM CHLORIDE 0.9 % (FLUSH) 0.9 %
10 SYRINGE (ML) INJECTION AS NEEDED
Status: DISCONTINUED | OUTPATIENT
Start: 2019-07-08 | End: 2019-07-08 | Stop reason: HOSPADM

## 2019-07-08 NOTE — ED PROVIDER NOTES
Subjective   Key Ospina is a 43 y.o. female who presents to the emergency department with complaints of palpitations that started one hour ago. The patient reports that she was sleeping when she felt her heart racing that woke her from sleep. The patient had nausea and cold sweats. The patient states that her pulse was between 115 and 128 bpm. The patient mentions that she has a history of arrythmia that occurs right before she starts her menstrual cycle. The patient has pain in the upper right side of her teeth. The patient was evaluated and prescribed a Z-pack that she started today. One year ago she had a Holter monitor that was normal. The patient denies any chest pain, nausea, shortness of breath, vomiting, and any other acute symptoms at this time.        History provided by:  Patient  Palpitations   Palpitations quality:  Fast  Onset quality:  Sudden  Duration:  1 hour  Timing:  Constant  Progression:  Improving  Chronicity:  Recurrent  Relieved by:  None tried  Worsened by:  Nothing  Ineffective treatments:  None tried  Associated symptoms: diaphoresis    Associated symptoms: no chest pain, no nausea, no shortness of breath and no vomiting        Review of Systems   Constitutional: Positive for diaphoresis.   Respiratory: Negative for shortness of breath.    Cardiovascular: Positive for palpitations. Negative for chest pain.   Gastrointestinal: Negative for nausea and vomiting.   All other systems reviewed and are negative.      Past Medical History:   Diagnosis Date   • Anemia     History of; fe Deficiency- intermittent   • Breast mass    • ELENA (dyspnea on exertion) 4/27/2016    Neg Cardiac and pulmonary workup   • Elevated EBV antibody titer    • Fatigue    • History of abdominal pain     Left upper quad   • History of anemia    • History of cardiovascular stress test 07/2013    Normal treadmill test. 12/04 and 9/10- normal exercise echo   • History of chest pain    • History of chest x-ray  2016    Unremarkable   • History of chest x-ray 2013    The lungs are clear. The heart is normal. There is no active disease in the chest.   • History of closed head injury    • History of colonoscopy 2013    normal   • History of esophagogastroduodenoscopy 2013    path c/w mild chronic gastritis and esophagitis   • History of Helicobacter infection 2011   • History of infectious mononucleosis     dx 8/10-chronically elevated EBV Titers   • History of influenza 2014   • History of PFTs 2016    Normal spirometry. ? lung volumes. Isolated mild reduction in diffusion capacity of unclear etiology.   • History of PFTs 2016    Normal spirometry. Post bronchodilator test not clearly improved.   • History of varicella    • Myalgia     and myositis    • Other forms of systemic lupus erythematosus (CMS/HCC)        Allergies   Allergen Reactions   • Augmentin [Amoxicillin-Pot Clavulanate] GI Intolerance     Severe reflux and heartburn   • Latex      Latex exam gloves   • Methylprednisolone      Manic feeling like she is climbing the wall    • Pulmicort [Budesonide] Other (See Comments)     Hyperactivity and Insomnia       Past Surgical History:   Procedure Laterality Date   •  SECTION     • CHOLECYSTECTOMY     • MOUTH SURGERY     • TONSILLECTOMY         Family History   Problem Relation Age of Onset   • Coronary artery disease Mother    • Hyperlipidemia Father    • Hyperlipidemia Paternal Aunt         multiple   • Hyperlipidemia Paternal Uncle         multiple       Social History     Socioeconomic History   • Marital status:      Spouse name: Not on file   • Number of children: 2   • Years of education: Not on file   • Highest education level: Not on file   Occupational History   • 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 She appears well-developed and well-nourished. No distress.   HENT:   Head: Normocephalic and atraumatic.   Eyes: Conjunctivae are normal. No scleral icterus.   Neck: Normal range of motion. Neck supple.   Cardiovascular: Normal rate, regular rhythm and normal heart sounds.   Pulmonary/Chest: Effort normal and breath sounds normal. No respiratory distress. She has no wheezes. She has no rales.   Abdominal: Soft. There is no tenderness. There is no guarding.   Musculoskeletal: Normal range of motion.   Neurological: She is alert and oriented to person, place, and time.   Skin: Skin is warm and dry. She is not diaphoretic.   Psychiatric: She has a normal mood and affect. Her behavior is normal.   Nursing note and vitals reviewed.      Procedures         ED Course     Recent Results (from the past 24 hour(s))   Magnesium    Collection Time: 19 12:50 AM   Result Value Ref Range    Magnesium 1.8 1.6 - 2.6 mg/dL   CBC Auto Differential    Collection Time: 19 12:50 AM   Result Value Ref Range    WBC 9.76 3.40 - 10.80 10*3/mm3    RBC 4.28 3.77 - 5.28 10*6/mm3    Hemoglobin 11.0 (L) 12.0 - 15.9 g/dL    Hematocrit 35.3 34.0 - 46.6 %    MCV 82.5 79.0 - 97.0 fL    MCH 25.7 (L) 26.6 - 33.0 pg    MCHC 31.2 (L) 31.5 - 35.7 g/dL    RDW 13.5 12.3 - 15.4 %    RDW-SD 40.4 37.0 - 54.0 fl    MPV 8.9 6.0 - 12.0 fL    Platelets 316 140 - 450 10*3/mm3    Neutrophil % 61.7 42.7 - 76.0 %    Lymphocyte % 31.7 19.6 - 45.3 %    Monocyte % 4.5 (L) 5.0 - 12.0 %     "Eosinophil % 1.4 0.3 - 6.2 %    Basophil % 0.4 0.0 - 1.5 %    Immature Grans % 0.3 0.0 - 0.5 %    Neutrophils, Absolute 6.02 1.70 - 7.00 10*3/mm3    Lymphocytes, Absolute 3.09 0.70 - 3.10 10*3/mm3    Monocytes, Absolute 0.44 0.10 - 0.90 10*3/mm3    Eosinophils, Absolute 0.14 0.00 - 0.40 10*3/mm3    Basophils, Absolute 0.04 0.00 - 0.20 10*3/mm3    Immature Grans, Absolute 0.03 0.00 - 0.05 10*3/mm3    nRBC 0.0 0.0 - 0.2 /100 WBC     Note: In addition to lab results from this visit, the labs listed above may include labs taken at another facility or during a different encounter within the last 24 hours. Please correlate lab times with ED admission and discharge times for further clarification of the services performed during this visit.    XR Chest 1 View   Final Result   No acute cardiopulmonary findings.      Signer Name: Troy Vergara MD    Signed: 7/8/2019 2:15 AM    Workstation Name: [id number]            Vitals:    07/08/19 0036   BP: 137/100   BP Location: Right arm   Patient Position: Lying   Pulse: 75   Resp: 16   Temp: 97.9 °F (36.6 °C)   TempSrc: Oral   SpO2: 100%   Weight: 83.9 kg (185 lb)   Height: 165.1 cm (65\")     Medications   sodium chloride 0.9 % flush 10 mL (not administered)     ECG/EMG Results (last 24 hours)     Procedure Component Value Units Date/Time    ECG 12 Lead [[id number]] Collected:  07/08/19 0039     Updated:  07/08/19 0041        ECG 12 Lead                             MDM    Final diagnoses:   Palpitations       Documentation assistance provided by ashvin Bach.  Information recorded by the scribe was done at my direction and has been verified and validated by me.     Lala Bach  07/08/19 0125       Juan Antonio Diehl DO  07/14/19 1406    "

## 2019-08-13 ENCOUNTER — TELEPHONE (OUTPATIENT)
Dept: INTERNAL MEDICINE | Facility: CLINIC | Age: 44
End: 2019-08-13

## 2019-08-13 NOTE — TELEPHONE ENCOUNTER
----- Message from Zoe Mills sent at 8/13/2019  8:44 AM EDT -----  Contact: Patient  Patient called in regards to her arthritis condition. Patient needs a note for work stating that she be permitted to wear open toe shoes to work.   Please return call and advise.

## 2019-08-13 NOTE — TELEPHONE ENCOUNTER
She states she is having trouble with plantar fascitis  And joint pain in both feet .  She states she is wearing open toe shoes and its fine for her to wear them at work as long as she gets a letter from her PCP     She states she will  the letter  Can leave message on vm when ready

## 2020-01-15 RX ORDER — FLUCONAZOLE 150 MG/1
TABLET ORAL
Qty: 2 TABLET | Refills: 1 | Status: SHIPPED | OUTPATIENT
Start: 2020-01-15 | End: 2020-07-16

## 2020-04-10 ENCOUNTER — TELEPHONE (OUTPATIENT)
Dept: INTERNAL MEDICINE | Facility: CLINIC | Age: 45
End: 2020-04-10

## 2020-04-10 ENCOUNTER — TELEMEDICINE (OUTPATIENT)
Dept: INTERNAL MEDICINE | Facility: CLINIC | Age: 45
End: 2020-04-10

## 2020-04-10 VITALS — TEMPERATURE: 98.8 F

## 2020-04-10 DIAGNOSIS — L03.311 CELLULITIS OF ABDOMINAL WALL: Primary | ICD-10-CM

## 2020-04-10 PROCEDURE — 99213 OFFICE O/P EST LOW 20 MIN: CPT | Performed by: INTERNAL MEDICINE

## 2020-04-10 RX ORDER — SULFAMETHOXAZOLE AND TRIMETHOPRIM 800; 160 MG/1; MG/1
1 TABLET ORAL 2 TIMES DAILY
Qty: 20 TABLET | Refills: 0 | Status: SHIPPED | OUTPATIENT
Start: 2020-04-10 | End: 2020-04-20

## 2020-04-10 NOTE — PROGRESS NOTES
Subjective       Key Ospina is a 44 y.o. female.     Chief Complaint   Patient presents with   • Rash       History obtained from the patient.      History of Present Illness     The patient is complaining of a red area on the left side of her  section scar for the past few days.  She has had this occur twice in the past, which resolved with antibiotics.  Usually, there is just a small papular lesion, but this time it seems like the whole scar area is red.  It was worse yesterday, was slightly better today.  She denies fever and chills.  She also feels that she has some inflammation from an adhesive bandage she had on it.  She has been using Bactroban for 3 to 4 days with some improvement.    The following portions of the patient's history were reviewed and updated as appropriate: allergies, current medications, past family history, past medical history, past social history, past surgical history and problem list.      Review of Systems   Constitutional: Negative for chills and fever.   Musculoskeletal: Negative for arthralgias, joint swelling and myalgias.   Skin: Positive for rash.   Hematological: Negative for adenopathy.           Objective     Temperature 98.8 °F (37.1 °C), not currently breastfeeding.    Physical Exam   Constitutional:   Overweight.   Neurological: She is alert.   Skin: Rash (erythematous area along left side of  scar) noted.   Psychiatric: She has a normal mood and affect.   Vitals reviewed.        Assessment/Plan   Key was seen today for rash.    Diagnoses and all orders for this visit:    Cellulitis of abdominal wall  -     sulfamethoxazole-trimethoprim (Bactrim DS) 800-160 MG per tablet; Take 1 tablet by mouth 2 (Two) Times a Day for 10 days.     The patient was instructed to complete a full 7 day course of the Bactroban twice per day.      Return if symptoms worsen or fail to improve.

## 2020-04-10 NOTE — TELEPHONE ENCOUNTER
Patient called stating on her c section scar it turns into an infection and she has some questions about that and some medicaitons.   Please advise   [phone]

## 2020-04-10 NOTE — TELEPHONE ENCOUNTER
Key states right below her  scar she has a small red area .  She thinks it may be an ingrown hair.  She has been putting Bactraban x 5 days but it is not going away .  She does not have a fever or discharge from the site.     She states this has happened before and Dr Simental has treated her with Antibiotics

## 2020-04-20 RX ORDER — PANTOPRAZOLE SODIUM 40 MG/1
TABLET, DELAYED RELEASE ORAL
Qty: 30 TABLET | Refills: 0 | Status: SHIPPED | OUTPATIENT
Start: 2020-04-20 | End: 2020-05-18

## 2020-05-18 RX ORDER — PANTOPRAZOLE SODIUM 40 MG/1
TABLET, DELAYED RELEASE ORAL
Qty: 30 TABLET | Refills: 3 | Status: SHIPPED | OUTPATIENT
Start: 2020-05-18 | End: 2020-09-17

## 2020-07-10 ENCOUNTER — TELEPHONE (OUTPATIENT)
Dept: INTERNAL MEDICINE | Facility: CLINIC | Age: 45
End: 2020-07-10

## 2020-07-10 NOTE — TELEPHONE ENCOUNTER
Madison was concerned about coming for her appointment next week.   Spoke with Key about the precautions and protocol for when she comes for her appointment next week.   Verb understanding given

## 2020-07-10 NOTE — TELEPHONE ENCOUNTER
Patient has a few questions to discuss with the clinical staff regarding her upcoming appt (office procedures/protocols during COVID pandemic). Please call patient and advise [phone].

## 2020-07-16 ENCOUNTER — OFFICE VISIT (OUTPATIENT)
Dept: INTERNAL MEDICINE | Facility: CLINIC | Age: 45
End: 2020-07-16

## 2020-07-16 VITALS
TEMPERATURE: 97.8 F | SYSTOLIC BLOOD PRESSURE: 130 MMHG | HEIGHT: 65 IN | RESPIRATION RATE: 18 BRPM | BODY MASS INDEX: 31.05 KG/M2 | HEART RATE: 72 BPM | DIASTOLIC BLOOD PRESSURE: 84 MMHG | WEIGHT: 186.38 LBS

## 2020-07-16 DIAGNOSIS — K21.9 GASTROESOPHAGEAL REFLUX DISEASE, ESOPHAGITIS PRESENCE NOT SPECIFIED: ICD-10-CM

## 2020-07-16 DIAGNOSIS — J47.9 BRONCHIECTASIS WITHOUT COMPLICATION (HCC): ICD-10-CM

## 2020-07-16 DIAGNOSIS — F41.1 GENERALIZED ANXIETY DISORDER: ICD-10-CM

## 2020-07-16 DIAGNOSIS — Z00.00 ENCOUNTER FOR HEALTH MAINTENANCE EXAMINATION IN ADULT: Primary | ICD-10-CM

## 2020-07-16 DIAGNOSIS — Z11.59 NEED FOR HEPATITIS C SCREENING TEST: ICD-10-CM

## 2020-07-16 DIAGNOSIS — Z79.899 HIGH RISK MEDICATION USE: ICD-10-CM

## 2020-07-16 DIAGNOSIS — I10 ESSENTIAL HYPERTENSION: ICD-10-CM

## 2020-07-16 DIAGNOSIS — Z23 NEED FOR VACCINATION FOR PNEUMOCOCCUS: ICD-10-CM

## 2020-07-16 DIAGNOSIS — E78.49 OTHER HYPERLIPIDEMIA: ICD-10-CM

## 2020-07-16 DIAGNOSIS — R73.03 PREDIABETES: ICD-10-CM

## 2020-07-16 LAB
25(OH)D3 SERPL-MCNC: 38.2 NG/ML (ref 30–100)
ALBUMIN SERPL-MCNC: 4.4 G/DL (ref 3.5–5.2)
ALBUMIN/GLOB SERPL: 1.8 G/DL
ALP SERPL-CCNC: 54 U/L (ref 39–117)
ALT SERPL W P-5'-P-CCNC: 11 U/L (ref 1–33)
ANION GAP SERPL CALCULATED.3IONS-SCNC: 11.4 MMOL/L (ref 5–15)
AST SERPL-CCNC: 14 U/L (ref 1–32)
BASOPHILS # BLD AUTO: 0.05 10*3/MM3 (ref 0–0.2)
BASOPHILS NFR BLD AUTO: 0.7 % (ref 0–1.5)
BILIRUB SERPL-MCNC: 0.2 MG/DL (ref 0–1.2)
BUN SERPL-MCNC: 14 MG/DL (ref 6–20)
BUN/CREAT SERPL: 15.4 (ref 7–25)
CALCIUM SPEC-SCNC: 9.4 MG/DL (ref 8.6–10.5)
CHLORIDE SERPL-SCNC: 102 MMOL/L (ref 98–107)
CHOLEST SERPL-MCNC: 238 MG/DL (ref 0–200)
CLARITY, POC: CLEAR
CO2 SERPL-SCNC: 24.6 MMOL/L (ref 22–29)
COLOR UR: YELLOW
CREAT SERPL-MCNC: 0.91 MG/DL (ref 0.57–1)
DEPRECATED RDW RBC AUTO: 39.8 FL (ref 37–54)
EOSINOPHIL # BLD AUTO: 0.11 10*3/MM3 (ref 0–0.4)
EOSINOPHIL NFR BLD AUTO: 1.5 % (ref 0.3–6.2)
ERYTHROCYTE [DISTWIDTH] IN BLOOD BY AUTOMATED COUNT: 14.2 % (ref 12.3–15.4)
EXPIRATION DATE: ABNORMAL
EXPIRATION DATE: NORMAL
GFR SERPL CREATININE-BSD FRML MDRD: 67 ML/MIN/1.73
GFR SERPL CREATININE-BSD FRML MDRD: 82 ML/MIN/1.73
GLOBULIN UR ELPH-MCNC: 2.5 GM/DL
GLUCOSE SERPL-MCNC: 110 MG/DL (ref 65–99)
GLUCOSE UR STRIP-MCNC: NEGATIVE MG/DL
HBA1C MFR BLD: 5.9 %
HCT VFR BLD AUTO: 36.1 % (ref 34–46.6)
HCV AB SER DONR QL: NORMAL
HDLC SERPL-MCNC: 38 MG/DL (ref 40–60)
HGB BLD-MCNC: 11.4 G/DL (ref 12–15.9)
IMM GRANULOCYTES # BLD AUTO: 0.01 10*3/MM3 (ref 0–0.05)
IMM GRANULOCYTES NFR BLD AUTO: 0.1 % (ref 0–0.5)
KETONES UR QL: NEGATIVE
LDLC SERPL CALC-MCNC: 172 MG/DL (ref 0–100)
LDLC/HDLC SERPL: 4.53 {RATIO}
LEUKOCYTE EST, POC: NEGATIVE
LYMPHOCYTES # BLD AUTO: 1.69 10*3/MM3 (ref 0.7–3.1)
LYMPHOCYTES NFR BLD AUTO: 23.4 % (ref 19.6–45.3)
Lab: 1051
Lab: NORMAL
MAGNESIUM SERPL-MCNC: 2 MG/DL (ref 1.6–2.6)
MCH RBC QN AUTO: 24.6 PG (ref 26.6–33)
MCHC RBC AUTO-ENTMCNC: 31.6 G/DL (ref 31.5–35.7)
MCV RBC AUTO: 78 FL (ref 79–97)
MONOCYTES # BLD AUTO: 0.36 10*3/MM3 (ref 0.1–0.9)
MONOCYTES NFR BLD AUTO: 5 % (ref 5–12)
NEUTROPHILS NFR BLD AUTO: 5.01 10*3/MM3 (ref 1.7–7)
NEUTROPHILS NFR BLD AUTO: 69.3 % (ref 42.7–76)
NITRITE UR-MCNC: NEGATIVE MG/ML
NRBC BLD AUTO-RTO: 0 /100 WBC (ref 0–0.2)
PH UR: 6 [PH] (ref 5–8)
PLATELET # BLD AUTO: 392 10*3/MM3 (ref 140–450)
PMV BLD AUTO: 9.1 FL (ref 6–12)
POTASSIUM SERPL-SCNC: 4.4 MMOL/L (ref 3.5–5.2)
PROT SERPL-MCNC: 6.9 G/DL (ref 6–8.5)
PROT UR STRIP-MCNC: NEGATIVE MG/DL
PROT/CREAT UR: 10 MG/G CREA (ref 0–200)
RBC # BLD AUTO: 4.63 10*6/MM3 (ref 3.77–5.28)
RBC # UR STRIP: ABNORMAL /UL
SODIUM SERPL-SCNC: 138 MMOL/L (ref 136–145)
SP GR UR: 1.01 (ref 1–1.03)
TRIGL SERPL-MCNC: 139 MG/DL (ref 0–150)
TSH SERPL DL<=0.05 MIU/L-ACNC: 1.3 UIU/ML (ref 0.27–4.2)
VLDLC SERPL-MCNC: 27.8 MG/DL (ref 5–40)
WBC # BLD AUTO: 7.23 10*3/MM3 (ref 3.4–10.8)

## 2020-07-16 PROCEDURE — 83735 ASSAY OF MAGNESIUM: CPT | Performed by: INTERNAL MEDICINE

## 2020-07-16 PROCEDURE — 99396 PREV VISIT EST AGE 40-64: CPT | Performed by: INTERNAL MEDICINE

## 2020-07-16 PROCEDURE — 81003 URINALYSIS AUTO W/O SCOPE: CPT | Performed by: INTERNAL MEDICINE

## 2020-07-16 PROCEDURE — 80061 LIPID PANEL: CPT | Performed by: INTERNAL MEDICINE

## 2020-07-16 PROCEDURE — 90471 IMMUNIZATION ADMIN: CPT | Performed by: INTERNAL MEDICINE

## 2020-07-16 PROCEDURE — 86803 HEPATITIS C AB TEST: CPT | Performed by: INTERNAL MEDICINE

## 2020-07-16 PROCEDURE — 84443 ASSAY THYROID STIM HORMONE: CPT | Performed by: INTERNAL MEDICINE

## 2020-07-16 PROCEDURE — 80053 COMPREHEN METABOLIC PANEL: CPT | Performed by: INTERNAL MEDICINE

## 2020-07-16 PROCEDURE — 90732 PPSV23 VACC 2 YRS+ SUBQ/IM: CPT | Performed by: INTERNAL MEDICINE

## 2020-07-16 PROCEDURE — 82306 VITAMIN D 25 HYDROXY: CPT | Performed by: INTERNAL MEDICINE

## 2020-07-16 PROCEDURE — 83036 HEMOGLOBIN GLYCOSYLATED A1C: CPT | Performed by: INTERNAL MEDICINE

## 2020-07-16 PROCEDURE — 36415 COLL VENOUS BLD VENIPUNCTURE: CPT | Performed by: INTERNAL MEDICINE

## 2020-07-16 PROCEDURE — 85025 COMPLETE CBC W/AUTO DIFF WBC: CPT | Performed by: INTERNAL MEDICINE

## 2020-07-16 RX ORDER — ALPRAZOLAM 0.25 MG/1
0.25 TABLET ORAL DAILY PRN
Qty: 20 TABLET | Refills: 1 | Status: SHIPPED | OUTPATIENT
Start: 2020-07-16 | End: 2021-01-19

## 2020-07-16 NOTE — PATIENT INSTRUCTIONS
Health Maintenance, Female  Adopting a healthy lifestyle and getting preventive care are important in promoting health and wellness. Ask your health care provider about:  · The right schedule for you to have regular tests and exams.  · Things you can do on your own to prevent diseases and keep yourself healthy.  What should I know about diet, weight, and exercise?  Eat a healthy diet    · Eat a diet that includes plenty of vegetables, fruits, low-fat dairy products, and lean protein.  · Do not eat a lot of foods that are high in solid fats, added sugars, or sodium.  Maintain a healthy weight  Body mass index (BMI) is used to identify weight problems. It estimates body fat based on height and weight. Your health care provider can help determine your BMI and help you achieve or maintain a healthy weight.  Get regular exercise  Get regular exercise. This is one of the most important things you can do for your health. Most adults should:  · Exercise for at least 150 minutes each week. The exercise should increase your heart rate and make you sweat (moderate-intensity exercise).  · Do strengthening exercises at least twice a week. This is in addition to the moderate-intensity exercise.  · Spend less time sitting. Even light physical activity can be beneficial.  Watch cholesterol and blood lipids  Have your blood tested for lipids and cholesterol at 20 years of age, then have this test every 5 years.  Have your cholesterol levels checked more often if:  · Your lipid or cholesterol levels are high.  · You are older than 40 years of age.  · You are at high risk for heart disease.  What should I know about cancer screening?  Depending on your health history and family history, you may need to have cancer screening at various ages. This may include screening for:  · Breast cancer.  · Cervical cancer.  · Colorectal cancer.  · Skin cancer.  · Lung cancer.  What should I know about heart disease, diabetes, and high blood  pressure?  Blood pressure and heart disease  · High blood pressure causes heart disease and increases the risk of stroke. This is more likely to develop in people who have high blood pressure readings, are of  descent, or are overweight.  · Have your blood pressure checked:  ? Every 3-5 years if you are 18-39 years of age.  ? Every year if you are 40 years old or older.  Diabetes  Have regular diabetes screenings. This checks your fasting blood sugar level. Have the screening done:  · Once every three years after age 40 if you are at a normal weight and have a low risk for diabetes.  · More often and at a younger age if you are overweight or have a high risk for diabetes.  What should I know about preventing infection?  Hepatitis B  If you have a higher risk for hepatitis B, you should be screened for this virus. Talk with your health care provider to find out if you are at risk for hepatitis B infection.  Hepatitis C  Testing is recommended for:  · Everyone born from 1945 through 1965.  · Anyone with known risk factors for hepatitis C.  Sexually transmitted infections (STIs)  · Get screened for STIs, including gonorrhea and chlamydia, if:  ? You are sexually active and are younger than 24 years of age.  ? You are older than 24 years of age and your health care provider tells you that you are at risk for this type of infection.  ? Your sexual activity has changed since you were last screened, and you are at increased risk for chlamydia or gonorrhea. Ask your health care provider if you are at risk.  · Ask your health care provider about whether you are at high risk for HIV. Your health care provider may recommend a prescription medicine to help prevent HIV infection. If you choose to take medicine to prevent HIV, you should first get tested for HIV. You should then be tested every 3 months for as long as you are taking the medicine.  Pregnancy  · If you are about to stop having your period (premenopausal) and  you may become pregnant, seek counseling before you get pregnant.  · Take 400 to 800 micrograms (mcg) of folic acid every day if you become pregnant.  · Ask for birth control (contraception) if you want to prevent pregnancy.  Osteoporosis and menopause  Osteoporosis is a disease in which the bones lose minerals and strength with aging. This can result in bone fractures. If you are 65 years old or older, or if you are at risk for osteoporosis and fractures, ask your health care provider if you should:  · Be screened for bone loss.  · Take a calcium or vitamin D supplement to lower your risk of fractures.  · Be given hormone replacement therapy (HRT) to treat symptoms of menopause.  Follow these instructions at home:  Lifestyle  · Do not use any products that contain nicotine or tobacco, such as cigarettes, e-cigarettes, and chewing tobacco. If you need help quitting, ask your health care provider.  · Do not use street drugs.  · Do not share needles.  · Ask your health care provider for help if you need support or information about quitting drugs.  Alcohol use  · Do not drink alcohol if:  ? Your health care provider tells you not to drink.  ? You are pregnant, may be pregnant, or are planning to become pregnant.  · If you drink alcohol:  ? Limit how much you use to 0-1 drink a day.  ? Limit intake if you are breastfeeding.  · Be aware of how much alcohol is in your drink. In the [place] one drink equals one 12 oz bottle of beer (355 mL), one 5 oz glass of wine (148 mL), or one 1½ oz glass of hard liquor (44 mL).  General instructions  · Schedule regular health, dental, and eye exams.  · Stay current with your vaccines.  · Tell your health care provider if:  ? You often feel depressed.  ? You have ever been abused or do not feel safe at home.  Summary  · Adopting a healthy lifestyle and getting preventive care are important in promoting health and wellness.  · Follow your health care provider's instructions about healthy  diet, exercising, and getting tested or screened for diseases.  · Follow your health care provider's instructions on monitoring your cholesterol and blood pressure.  This information is not intended to replace advice given to you by your health care provider. Make sure you discuss any questions you have with your health care provider.  Document Released: 07/02/2012 Document Revised: 12/11/2019 Document Reviewed: 12/11/2019  [employer] Patient Education © 2020 [employer]      Heart-Healthy Eating Plan  Many factors influence your heart (coronary) health, including eating and exercise habits. Coronary risk increases with abnormal blood fat (lipid) levels. Heart-healthy meal planning includes limiting unhealthy fats, increasing healthy fats, and making other diet and lifestyle changes.  What is my plan?  Your health care provider may recommend that you:  · Limit your fat intake to _________% or less of your total calories each day.  · Limit your saturated fat intake to _________% or less of your total calories each day.  · Limit the amount of cholesterol in your diet to less than _________ mg per day.  What are tips for following this plan?  Cooking  Cook foods using methods other than frying. Baking, boiling, grilling, and broiling are all good options. Other ways to reduce fat include:  · Removing the skin from poultry.  · Removing all visible fats from meats.  · Steaming vegetables in water or broth.  Meal planning    · At meals, imagine dividing your plate into fourths:  ? Fill one-half of your plate with vegetables and green salads.  ? Fill one-fourth of your plate with whole grains.  ? Fill one-fourth of your plate with lean protein foods.  · Eat 4-5 servings of vegetables per day. One serving equals 1 cup raw or cooked vegetable, or 2 cups raw leafy greens.  · Eat 4-5 servings of fruit per day. One serving equals 1 medium whole fruit, ¼ cup dried fruit, ½ cup fresh, frozen, or canned fruit, or ½ cup 100% fruit  juice.  · Eat more foods that contain soluble fiber. Examples include apples, broccoli, carrots, beans, peas, and barley. Aim to get 25-30 g of fiber per day.  · Increase your consumption of legumes, nuts, and seeds to 4-5 servings per week. One serving of dried beans or legumes equals ½ cup cooked, 1 serving of nuts is ¼ cup, and 1 serving of seeds equals 1 tablespoon.  Fats  · Choose healthy fats more often. Choose monounsaturated and polyunsaturated fats, such as olive and canola oils, flaxseeds, walnuts, almonds, and seeds.  · Eat more omega-3 fats. Choose salmon, mackerel, sardines, tuna, flaxseed oil, and ground flaxseeds. Aim to eat fish at least 2 times each week.  · Check food labels carefully to identify foods with trans fats or high amounts of saturated fat.  · Limit saturated fats. These are found in animal products, such as meats, butter, and cream. Plant sources of saturated fats include palm oil, palm kernel oil, and coconut oil.  · Avoid foods with partially hydrogenated oils in them. These contain trans fats. Examples are stick margarine, some tub margarines, cookies, crackers, and other baked goods.  · Avoid fried foods.  General information  · Eat more home-cooked food and less restaurant, buffet, and fast food.  · Limit or avoid alcohol.  · Limit foods that are high in starch and sugar.  · Lose weight if you are overweight. Losing just 5-10% of your body weight can help your overall health and prevent diseases such as diabetes and heart disease.  · Monitor your salt (sodium) intake, especially if you have high blood pressure. Talk with your health care provider about your sodium intake.  · Try to incorporate more vegetarian meals weekly.  What foods can I eat?  Fruits  All fresh, canned (in natural juice), or frozen fruits.  Vegetables  Fresh or frozen vegetables (raw, steamed, roasted, or grilled). Green salads.  Grains  Most grains. Choose whole wheat and whole grains most of the time. Rice and  pasta, including brown rice and pastas made with whole wheat.  Meats and other proteins  Lean, well-trimmed beef, veal, pork, and lamb. Chicken and turkey without skin. All fish and shellfish. Wild duck, rabbit, pheasant, and venison. Egg whites or low-cholesterol egg substitutes. Dried beans, peas, lentils, and tofu. Seeds and most nuts.  Dairy  Low-fat or nonfat cheeses, including ricotta and mozzarella. Skim or 1% milk (liquid, powdered, or evaporated). Buttermilk made with low-fat milk. Nonfat or low-fat yogurt.  Fats and oils  Non-hydrogenated (trans-free) margarines. Vegetable oils, including soybean, sesame, sunflower, olive, peanut, safflower, corn, canola, and cottonseed. Salad dressings or mayonnaise made with a vegetable oil.  Beverages  Water (mineral or sparkling). Coffee and tea. Diet carbonated beverages.  Sweets and desserts  Sherbet, gelatin, and fruit ice. Small amounts of dark chocolate.  Limit all sweets and desserts.  Seasonings and condiments  All seasonings and condiments.  The items listed above may not be a complete list of foods and beverages you can eat. Contact a dietitian for more options.  What foods are not recommended?  Fruits  Canned fruit in heavy syrup. Fruit in cream or butter sauce. Fried fruit. Limit coconut.  Vegetables  Vegetables cooked in cheese, cream, or butter sauce. Fried vegetables.  Grains  Breads made with saturated or trans fats, oils, or whole milk. Croissants. Sweet rolls. Donuts. High-fat crackers, such as cheese crackers.  Meats and other proteins  Fatty meats, such as hot dogs, ribs, sausage, vail, rib-eye roast or steak. High-fat deli meats, such as salami and bologna. Caviar. Domestic duck and goose. Organ meats, such as liver.  Dairy  Cream, sour cream, cream cheese, and creamed cottage cheese. Whole milk cheeses. Whole or 2% milk (liquid, evaporated, or condensed). Whole buttermilk. Cream sauce or high-fat cheese sauce. Whole-milk yogurt.  Fats and  oils  Meat fat, or shortening. Cocoa butter, hydrogenated oils, palm oil, coconut oil, palm kernel oil. Solid fats and shortenings, including vail fat, salt pork, lard, and butter. Nondairy cream substitutes. Salad dressings with cheese or sour cream.  Beverages  Regular sodas and any drinks with added sugar.  Sweets and desserts  Frosting. Pudding. Cookies. Cakes. Pies. Milk chocolate or white chocolate. Buttered syrups. Full-fat ice cream or ice cream drinks.  The items listed above may not be a complete list of foods and beverages to avoid. Contact a dietitian for more information.  Summary  · Heart-healthy meal planning includes limiting unhealthy fats, increasing healthy fats, and making other diet and lifestyle changes.  · Lose weight if you are overweight. Losing just 5-10% of your body weight can help your overall health and prevent diseases such as diabetes and heart disease.  · Focus on eating a balance of foods, including fruits and vegetables, low-fat or nonfat dairy, lean protein, nuts and legumes, whole grains, and heart-healthy oils and fats.  This information is not intended to replace advice given to you by your health care provider. Make sure you discuss any questions you have with your health care provider.  Document Released: 09/26/2009 Document Revised: 01/25/2019 Document Reviewed: 01/25/2019  [employer] Patient Education © 2020 [employer]      Exercising to Stay Healthy  To become healthy and stay healthy, it is recommended that you do moderate-intensity and vigorous-intensity exercise. You can tell that you are exercising at a moderate intensity if your heart starts beating faster and you start breathing faster but can still hold a conversation. You can tell that you are exercising at a vigorous intensity if you are breathing much harder and faster and cannot hold a conversation while exercising.  Exercising regularly is important. It has many health benefits, such as:  · Improving overall  fitness, flexibility, and endurance.  · Increasing bone density.  · Helping with weight control.  · Decreasing body fat.  · Increasing muscle strength.  · Reducing stress and tension.  · Improving overall health.  How often should I exercise?  Choose an activity that you enjoy, and set realistic goals. Your health care provider can help you make an activity plan that works for you.  Exercise regularly as told by your health care provider. This may include:  · Doing strength training two times a week, such as:  ? Lifting weights.  ? Using resistance bands.  ? Push-ups.  ? Sit-ups.  ? Yoga.  · Doing a certain intensity of exercise for a given amount of time. Choose from these options:  ? A total of 150 minutes of moderate-intensity exercise every week.  ? A total of 75 minutes of vigorous-intensity exercise every week.  ? A mix of moderate-intensity and vigorous-intensity exercise every week.  Children, pregnant women, people who have not exercised regularly, people who are overweight, and older adults may need to talk with a health care provider about what activities are safe to do. If you have a medical condition, be sure to talk with your health care provider before you start a new exercise program.  What are some exercise ideas?  Moderate-intensity exercise ideas include:  · Walking 1 mile (1.6 km) in about 15 minutes.  · Biking.  · Hiking.  · Golfing.  · Dancing.  · Water aerobics.  Vigorous-intensity exercise ideas include:  · Walking 4.5 miles (7.2 km) or more in about 1 hour.  · Jogging or running 5 miles (8 km) in about 1 hour.  · Biking 10 miles (16.1 km) or more in about 1 hour.  · Lap swimming.  · Roller-skating or in-line skating.  · Cross-country skiing.  · Vigorous competitive sports, such as football, basketball, and soccer.  · Jumping rope.  · Aerobic dancing.  What are some everyday activities that can help me to get exercise?  · Yard work, such as:  ? Pushing a .  ? Raking and bagging  leaves.  · Washing your car.  · Pushing a stroller.  · Shoveling snow.  · Gardening.  · Washing windows or floors.  How can I be more active in my day-to-day activities?  · Use stairs instead of an elevator.  · Take a walk during your lunch break.  · If you drive, park your car farther away from your work or school.  · If you take public transportation, get off one stop early and walk the rest of the way.  · Stand up or walk around during all of your indoor phone calls.  · Get up, stretch, and walk around every 30 minutes throughout the day.  · Enjoy exercise with a friend. Support to continue exercising will help you keep a regular routine of activity.  What guidelines can I follow while exercising?  · Before you start a new exercise program, talk with your health care provider.  · Do not exercise so much that you hurt yourself, feel dizzy, or get very short of breath.  · Wear comfortable clothes and wear shoes with good support.  · Drink plenty of water while you exercise to prevent dehydration or heat stroke.  · Work out until your breathing and your heartbeat get faster.  Where to find more information  · [place] Department of Health and Human Services: www.hhs.gov  · Centers for Disease Control and Prevention (CDC): www.cdc.gov  Summary  · Exercising regularly is important. It will improve your overall fitness, flexibility, and endurance.  · Regular exercise also will improve your overall health. It can help you control your weight, reduce stress, and improve your bone density.  · Do not exercise so much that you hurt yourself, feel dizzy, or get very short of breath.  · Before you start a new exercise program, talk with your health care provider.  This information is not intended to replace advice given to you by your health care provider. Make sure you discuss any questions you have with your health care provider.  Document Released: 01/20/2012 Document Revised: 11/30/2018 Document Reviewed: 11/08/2018  [employer]  Patient Education © 2020 [employer]

## 2020-07-16 NOTE — PROGRESS NOTES
Subjective     Chief Complaint:  Physical Exam.    History of Present Illness    History obtained from the patient.    The patient has seen her Pulmonology, Dr. Mon, no recent appointment.  Her Bronchiectasis has been stable.  She has intermittent shortness of breath and a cough productive of clear sputum some mornings.  She is on Allegra and Flonase for Seasonal Allergies.  She has not needed ProAir MDI.      The patient states her Gynecologist did hormone tests last week, which are pending.        Primary Care Cardiac Diagnostic [facility]: The patient is here today for a follow-up visit.  She was last seen on 3/14/2019.    Her Hypertension has been stable.  Medication (s): None.  Her Hyperlipidemia has been unstable.   Her LDL goal is < 130 mg/dL, last LDL was 162 mg/dL and .   Medication(s): None. She has taken Crestor and Lipitor in the past and they both caused myalgias.   Her Prediabetes has been stable.  Medication:  None.      Interval Events: Last HgA1c on 3/14/19 was 5.9.  She does not check her blood pressure at home.      Symptoms: Stable intermittent shortness of breath.  Denies chest pain,  ELENA, orthopnea, PND, palpitations, syncope, lower extremity edema, intermittent leg claudication, lightheadedness, and dizziness.  Associated Symptoms: Weight decreased 3 pounds since 3/14/19.  Has stable myalgias and arthralgias.  No fatigue, headache, polydipsia, polyuria, memory loss, concentration problems, or focal neurologic deficits.       Lifestyle and Disease Management: Diet: She consumes a diverse and healthy diet. Weight Issues: She has weight concerns. Exercise: She exercises 5-6 times per week, online Jazzercise and walking.  Tobacco Use: Former smoker (1 ppd x 2 years).       Gastroesophageal Reflux Disease Follow-Up: The patient is being seen for follow-up of Gastroesophageal Reflux Disease, which is stable.   Comorbid Illnesses:  None.  Interval Events: None.  Symptoms: Has  occasional heartburn and acid reflux.  She denies abdominal pain, nausea, vomiting, dysphagia, odynophagia, melena, hematochezia, early satiety, belching, and bloating.  Associated Symptoms: no chronic sore throat, hoarseness, cough, or wheezing  Medications:  Protonix.  The patient is adherent to her medication regimen, but she denies medication side effects.      Anxiety Disorder Follow-up: The patient is here for follow-up of Anxiety disorder, which is overall stable.  Interval Events: Her anxiety is slightly worse due to the COVID-19 Pandemic.  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  Symptoms: Occasional anxiety.  No depression, insomnia, panic attacks, or suicidal thoughts.  Medication:   Alprazalom as needed.  She has been taking it approximately 2-3 times per month, on average, but last dose was in March 2020.    Key Ospina is a 44 y.o. female who presents for an Annual Physical.      PMH, PSH, SocHx, FamHx, Allergies, and Medications: Reviewed and updated.    Outpatient Medications Prior to Visit   Medication Sig Dispense Refill   • fexofenadine (ALLEGRA) 180 MG tablet Take 1 tablet by mouth daily.     • fluticasone (FLONASE) 50 MCG/ACT nasal spray 2 sprays into each nostril daily. Administer 1 spray in each nostril twice daily.     • pantoprazole (PROTONIX) 40 MG EC tablet TAKE ONE TABLET BY MOUTH DAILY 30 tablet 3   • tretinoin (RETIN-A) 0.025 % cream Apply 1 application topically to the appropriate area as directed Daily As Needed (prn). 20 g 6   • ALPRAZolam (XANAX) 0.25 MG tablet Take 1 tablet by mouth Daily As Needed (prn). 20 tablet 1   • fluconazole (DIFLUCAN) 150 MG tablet Take 1 po daily x 1 day  . May repeat after 4 days 2 tablet 1   • PROAIR  (90 Base) MCG/ACT inhaler INHALE TWO PUFFS BY MOUTH EVERY 4 TO 6 HOURS AS NEEDED FOR COUGH AND FOR SHORTNESS OF BREATH 1 inhaler 10     No facility-administered medications prior to visit.        Immunization History   Administered Date(s)  Administered   • Flu Mist 10/25/2013   • PPD Test 2017   • Td 2005   • Tdap 2013         Patient Active Problem List   Diagnosis   • Allergic rhinitis   • Elevated EBV antibody titer quantitative   • Gastroesophageal reflux disease   • Hyperlipidemia   • Hypertension   • Prediabetes   • Anxiety disorder   • Positive YOKO (antinuclear antibody)   • Bronchiectasis without complication (CMS/HCC)   • Other forms of systemic lupus erythematosus (CMS/HCC)       Health Habits:  Dental Exam. up to date  Eye Exam. up to date  Hearing Loss:  No  Exercise: 5-6 times/week.  Current exercise activities include: walking and Aerobics  Diet: Healthy  Multivitamin: No    Safe Driving:  Yes  Seat Belt:  Yes  Bike Helmet:  Yes  Skin Screening:  Yes  Sunscreen: Yes  SBE / CLAU: Yes, every few months  Sexual Activity:  Yes  Birth Control:  Vasectomy  STD Prevention:  N/A    Last Pap: May 2019, normal per patient report (GYN).  She states she had a pelvic exam last week.  Last Mammogram: 2019, category 1.  Last DEXA Scan: N/A  Last Colonoscopy: 2013, normal  Last PSA: N/A    Social:    Social History     Socioeconomic History   • Marital status:      Spouse name: Not on file   • Number of children: 2   • Years of education: Not on file   • Highest education level: Not on file   Occupational History   • Occupation: Teacher     Comment: full time   Tobacco Use   • Smoking status: Former Smoker     Packs/day: 1.00     Years: 2.00     Pack years: 2.00     Types: Cigarettes     Last attempt to quit: 1998     Years since quittin.5   • Smokeless tobacco: Never Used   Substance and Sexual Activity   • Alcohol use: Yes     Comment: glass of wine or a cocktail, once every 3 months   • Drug use: No   • Sexual activity: Yes     Partners: Male     Birth control/protection: Partner's vasectomy         Current Medical Providers:    Cynthia Simental MD (Internal Medicine / Pediatrics)    [facility]  providers who are involved in the care of this patient are listed above.         Review of Systems   Constitutional: Negative for chills, fatigue, fever and unexpected weight change.        Has night sweats with PMS.    HENT: Positive for congestion (occasional with allergies). Negative for ear pain, hearing loss, nosebleeds, postnasal drip, rhinorrhea, sinus pressure, sinus pain, sneezing, sore throat, tinnitus and voice change.         Denies snoring.   Eyes: Negative for photophobia, pain, discharge, redness, itching and visual disturbance.   Respiratory: Positive for cough and shortness of breath. Negative for chest tightness, wheezing and stridor.         No chest congestion.  No hemoptysis.   Cardiovascular: Negative for chest pain, palpitations and leg swelling.        No orthopnea, ELENA, or PND.  No claudication or syncope.   Gastrointestinal: Negative for abdominal pain, blood in stool, constipation, diarrhea, nausea, rectal pain and vomiting.        No melena.  No hematemesis.  Has occasional heartburn, but no dysphagia or odynophagia.  No early satiety, belching, or bloating.    Endocrine: Negative for cold intolerance, heat intolerance, polydipsia, polyphagia and polyuria.        No hair loss or dry skin. Has hot flashes with PMS.     Genitourinary: Positive for menstrual problem (has PMS). Negative for difficulty urinating, dyspareunia, dysuria, flank pain, frequency, hematuria, pelvic pain, urgency, vaginal bleeding and vaginal discharge.        No nocturia, incomplete emptying, or incontinence.   Musculoskeletal: Positive for arthralgias (intermittent ), back pain (intermittent ) and myalgias (intermittent ). Negative for gait problem, joint swelling, neck pain and neck stiffness.        No joint stiffness.   Skin: Negative for rash.        No new skin lesions or changes in skin lesions. No breast pain or masses.  No nipple discharge or nipple inversion.   Neurological: Negative for dizziness, tremors,  "syncope, speech difficulty, weakness, light-headedness, numbness and headaches.        No tingling.  No memory loss.  No decreased concentration.   Hematological: Negative for adenopathy. Does not bruise/bleed easily.   Psychiatric/Behavioral: Positive for sleep disturbance (with PMS). Negative for confusion and suicidal ideas. The patient is nervous/anxious.         No depression.           Objective     Vitals:    07/16/20 0854   BP: 130/84   BP Location: Right arm   Pulse: 72   Resp: 18   Temp: 97.8 °F (36.6 °C)   TempSrc: Temporal   Weight: 84.5 kg (186 lb 6 oz)   Height: 163.8 cm (64.5\")       Body mass index is 31.5 kg/m².    Physical Exam   Constitutional:   Obese.   HENT:   Head: Normocephalic and atraumatic.   Right Ear: Tympanic membrane, external ear and ear canal normal.   Left Ear: Tympanic membrane, external ear and ear canal normal.   Mouth/Throat: Oropharynx is clear and moist. No oral lesions.   Tonsils absent.   Eyes: Pupils are equal, round, and reactive to light. Conjunctivae and EOM are normal.   Neck: Normal range of motion. Neck supple. Carotid bruit is not present. No thyroid mass and no thyromegaly present.   Cardiovascular: Normal rate, regular rhythm, normal heart sounds and intact distal pulses. Exam reveals no gallop and no friction rub.   No murmur heard.  No peripheral edema.   Pulmonary/Chest: Effort normal and breath sounds normal. Right breast exhibits no inverted nipple, no mass, no nipple discharge, no skin change and no tenderness. Left breast exhibits no inverted nipple, no mass, no nipple discharge, no skin change and no tenderness.   Abdominal: Soft. Bowel sounds are normal. She exhibits no distension, no abdominal bruit and no mass. There is no hepatosplenomegaly. There is no tenderness.   Genitourinary:   Genitourinary Comments:  and rectal exam deferred.   Musculoskeletal: Normal range of motion.   Lymphadenopathy:     She has no cervical adenopathy.     She has no " axillary adenopathy. No inguinal adenopathy noted on the right or left side.        Right: No inguinal and no supraclavicular adenopathy present.        Left: No inguinal and no supraclavicular adenopathy present.   Neurological: She is alert. She has normal strength and normal reflexes. No cranial nerve deficit. Coordination and gait normal.   Skin: No lesion and no rash noted.   No atypical skin lesions.   Psychiatric: She has a normal mood and affect.   Nursing note and vitals reviewed.      PHQ-2 Depression Screening  Little interest or pleasure in doing things? 0   Feeling down, depressed, or hopeless? 1(feeling down )   PHQ-2 Total Score 1         Counseling was given to patient for the following topics:  appropriate exercise, healthy eating habits, disease prevention, risk factors for cancer, importance of self breast exam and breast health, importance of immunizations, including risks and benefits, sun safety, seatbelt use and safe driving. Also discussed the importance of regular dental and vision care, as well recommendation for a yearly screening skin exam after age 40.  Written information provided to patient on these topics and other health maintenance issues.    Results for orders placed or performed in visit on 07/16/20   POC Glycosylated Hemoglobin (Hb A1C)   Result Value Ref Range    Hemoglobin A1C 5.9 %    Lot Number [id number]     Expiration Date 3-23-22    POC Urinalysis Dipstick, Multipro   Result Value Ref Range    Color Yellow Yellow, Straw, Dark Yellow, Cindi    Clarity, UA Clear Clear    Glucose, UA Negative Negative, 1000 mg/dL (3+) mg/dL    Ketones, UA Negative Negative    Specific Gravity  1.010 1.005 - 1.030    Blood, UA Moderate (A) Negative    pH, Urine 6.0 5.0 - 8.0    Protein, POC Negative Negative mg/dL    Nitrite, UA Negative Negative    Leukocytes Negative Negative    Protein/Creatinine Ratio, Urine 10.0 0.0 - 200.0 mg/G Crea    Lot Number [id number]     Expiration Date 4-30-21       She states she is on her menstrual cycle.    Assessment/Plan       Diagnoses and all orders for this visit:    Encounter for health maintenance examination in adult  -     Lipid Panel  -     Comprehensive Metabolic Panel  -     TSH  -     Vitamin D 25 Hydroxy  -     CBC & Differential  -     Hepatitis C Antibody  -     Magnesium  -     POC Glycosylated Hemoglobin (Hb A1C)  -     POC Urinalysis Dipstick, Multipro  -     CBC Auto Differential    Essential hypertension  -     Lipid Panel  -     Comprehensive Metabolic Panel  -     TSH  -     CBC & Differential  -     POC Urinalysis Dipstick, Multipro  -     CBC Auto Differential    Other hyperlipidemia  -     Lipid Panel  -     Comprehensive Metabolic Panel  -     TSH  -     CBC & Differential  -     CBC Auto Differential    Prediabetes  -     POC Glycosylated Hemoglobin (Hb A1C)    Gastroesophageal reflux disease, esophagitis presence not specified   Continue current medication(s) as noted in the history of present illness.    Bronchiectasis without complication (CMS/HCC)   Stable, no medication.    Generalized anxiety disorder  -     ALPRAZolam (XANAX) 0.25 MG tablet; Take 1 tablet by mouth Daily As Needed (prn)- refill.   Continue current medication(s) as noted in the history of present illness.    The patient was instructed in the side effects of the medication.  Risks of the potential for tolerance, dependence, and addiction were discussed.  The patient was instructed to take the lowest dosage of the medication, at the lowest frequency, and for the shortest period of time possible.  The patient was instructed not to receive controlled substances or narcotics from other doctors, and not to giveaway or sell the medication.    The patient was instructed to abstain from illicit drug use.  The patient was instructed to avoid alcohol while taking these medications.      Narcotics/controlled substance agreement, Aroldo report, and Urine Drug Screen were updated today  if needed.    High risk medication use  -     Magnesium  -     Urine Drug Screen - Urine, Clean Catch; Future    Need for hepatitis C screening test  -     Hepatitis C Antibody    Need for vaccination for pneumococcus  -     Pneumococcal Polysaccharide Vaccine 23-Valent Greater Than or Equal To 3yo Subcutaneous / IM    The patient agrees to schedule her Mammogram.    The patient declined scheduling a Cardiac CT today.      Return in about 6 months (around 1/16/2021) for Recheck Prediabetes, fasting.

## 2020-07-21 ENCOUNTER — TELEPHONE (OUTPATIENT)
Dept: INTERNAL MEDICINE | Facility: CLINIC | Age: 45
End: 2020-07-21

## 2020-07-21 NOTE — TELEPHONE ENCOUNTER
Patient states she would like to try on her own for a couple months to get it back down before starting medication. She is asking for a return call to discuss.   [phone]    Notes recorded by Rona Vargas MA on 7/21/2020 at 10:19 AM EDT  LMOVM to return call @ 1018am   Office # given  ------    Notes recorded by Cynthia Simnetal MD on 7/21/2020 at 12:45 AM EDT  Call patient please.  Her cholesterol levels have gone up.  I recommend a statin cholesterol medication.  Is she agreeable?

## 2020-07-24 NOTE — TELEPHONE ENCOUNTER
The patient did return the call once.  Please keep the message open, and try calling again on Monday, 7/27/2020.  Please make sure she has a six-month follow-up appointment, fasting, scheduled.  If she agrees to start the medicine, would need to be seen in 6 to 8 weeks instead.

## 2020-07-24 NOTE — TELEPHONE ENCOUNTER
Key called and is wanting to try and watch her diet and increase exercise to see if she can get the cholesterol down.  Unless it is dangerous for her not to take the medication

## 2020-09-17 RX ORDER — PANTOPRAZOLE SODIUM 40 MG/1
TABLET, DELAYED RELEASE ORAL
Qty: 30 TABLET | Refills: 3 | Status: SHIPPED | OUTPATIENT
Start: 2020-09-17 | End: 2021-01-15

## 2020-10-07 ENCOUNTER — TELEPHONE (OUTPATIENT)
Dept: INTERNAL MEDICINE | Facility: CLINIC | Age: 45
End: 2020-10-07

## 2020-10-07 DIAGNOSIS — N76.0 ACUTE VAGINITIS: Primary | ICD-10-CM

## 2020-10-07 RX ORDER — FLUCONAZOLE 150 MG/1
150 TABLET ORAL ONCE
Qty: 2 TABLET | Refills: 0 | Status: SHIPPED | OUTPATIENT
Start: 2020-10-07 | End: 2021-03-22

## 2020-10-07 NOTE — TELEPHONE ENCOUNTER
Patient states she is having vaginal itching, burning and discharge. 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

## 2020-10-07 NOTE — TELEPHONE ENCOUNTER
Patient is calling to get diflucen prescription.  Patient would like it called into pharmacy.  Patient advised that any information can be leaved on voicemail. Please advise

## 2021-01-15 DIAGNOSIS — K21.9 GASTROESOPHAGEAL REFLUX DISEASE, UNSPECIFIED WHETHER ESOPHAGITIS PRESENT: Primary | ICD-10-CM

## 2021-01-15 RX ORDER — PANTOPRAZOLE SODIUM 40 MG/1
TABLET, DELAYED RELEASE ORAL
Qty: 30 TABLET | Refills: 5 | Status: SHIPPED | OUTPATIENT
Start: 2021-01-15 | End: 2021-07-16

## 2021-01-15 NOTE — TELEPHONE ENCOUNTER
Last Office Visit: 07/16/2020  Next Office Visit: None scheduled     Labs completed in past 6 months? yes  Labs completed in past year? yes    Last Refill Date: 09/17/2020  Quantity: 30  Refills: 3    Pharmacy:  [facility] [address] [place] [address] [address] - [phone]  - [phone] FX

## 2021-01-15 NOTE — TELEPHONE ENCOUNTER
Prescription sent to the pharmacy.  The patient is due for follow-up visit, fasting, this month or next.  Please schedule.

## 2021-01-15 NOTE — TELEPHONE ENCOUNTER
Key Ospina [phone]  [facility] advising of clinical message, office number given to scheduled appointment, [phone].

## 2021-01-19 DIAGNOSIS — F41.1 GENERALIZED ANXIETY DISORDER: ICD-10-CM

## 2021-01-19 RX ORDER — ALPRAZOLAM 0.25 MG/1
TABLET ORAL
Qty: 20 TABLET | Refills: 0 | Status: SHIPPED | OUTPATIENT
Start: 2021-01-19 | End: 2022-02-23

## 2021-01-19 NOTE — TELEPHONE ENCOUNTER
Last OV 07/16/2020  Next OV Not scheduled yet looks like they had one but then cancelled it    Last Rx 07/16/2020 #20 refills 1

## 2021-01-19 NOTE — TELEPHONE ENCOUNTER
Pt wants to speak with Dr. Simental in regards to the COVID vaccine due to her underline conditions. Please advise.

## 2021-01-19 NOTE — TELEPHONE ENCOUNTER
Spoke to Lorenzo and explained we got a request from the pharmacy for the xanax  She does not take it every day and cannot make it in for visit until spring break .  She has some pills left and wants to cancel rx   Also explained due to Dr Hola garduno controlled medications she has be seen every 3 months.  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

## 2021-01-19 NOTE — TELEPHONE ENCOUNTER
I have sent a prescription for 1 month to the pharmacy, but the patient needs to schedule a follow-up appointment with me since this is a controlled medication.  This can be in office or a video visit.  Please schedule.    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    Please give her websites for [facility] ([email or web address]) and [place] ([email or web address])

## 2021-01-19 NOTE — TELEPHONE ENCOUNTER
Please call pharmacy and cancel Xanax Rx.    Even if she is only using the Xanax occasionally, I do need to see her at least every 6 months.  As I said this can be a video visit, for example on a Monday night.  If she is truly unable to do that, please have her schedule an appointment to see me in office fasting during spring break.    I would recommend she get the Covid vaccine.  There is no contraindication for people who are immunocompromised.

## 2021-01-20 NOTE — TELEPHONE ENCOUNTER
The patient called back.  She is agreeable to an every 6-month follow-up, and will try to schedule a follow-up appointment in the next few months.  She states  rarely takes the Xanax.    Discussed the COVID-19 vaccine, she is signed up to get it, and I did recommend she proceed with that appointment.

## 2021-03-20 DIAGNOSIS — N76.0 ACUTE VAGINITIS: ICD-10-CM

## 2021-03-21 NOTE — TELEPHONE ENCOUNTER
Acute medication, does this require an appointment for a medication refill. Please advise.   Last appt: 07/16/2020

## 2021-03-22 RX ORDER — FLUCONAZOLE 150 MG/1
TABLET ORAL
Qty: 2 TABLET | Refills: 0 | Status: SHIPPED | OUTPATIENT
Start: 2021-03-22 | End: 2022-02-23

## 2021-04-13 ENCOUNTER — TELEPHONE (OUTPATIENT)
Dept: INTERNAL MEDICINE | Facility: CLINIC | Age: 46
End: 2021-04-13

## 2021-04-13 NOTE — TELEPHONE ENCOUNTER
Caller: Key Ospina    Relationship: Self    Best call back number: [phone]    What is the best time to reach you: AFTER 4:00 PM TODAY.    Who are you requesting to speak with (clinical staff, provider,  specific staff member): DR. HOWE OR CHELLE    Do you know the name of the person who called: PATIENT    What was the call regarding: STOMACH ISSUES SHE'S BEEN HAVING.    Do you require a callback: YES

## 2021-04-14 NOTE — TELEPHONE ENCOUNTER
PATIENT CALLED AGAIN TODAY AND TRIED TO SCHEDULE AN APPOINTMENT BUT WE COULDN'T FIND ONE THAT WORKED FOR HER.  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    CALLBACK:  [phone]

## 2021-04-15 ENCOUNTER — TELEMEDICINE (OUTPATIENT)
Dept: INTERNAL MEDICINE | Facility: CLINIC | Age: 46
End: 2021-04-15

## 2021-04-15 ENCOUNTER — TELEPHONE (OUTPATIENT)
Dept: INTERNAL MEDICINE | Facility: CLINIC | Age: 46
End: 2021-04-15

## 2021-04-15 VITALS — TEMPERATURE: 98.2 F

## 2021-04-15 DIAGNOSIS — R10.33 PERIUMBILICAL ABDOMINAL PAIN: ICD-10-CM

## 2021-04-15 DIAGNOSIS — R10.12 LEFT UPPER QUADRANT ABDOMINAL PAIN: Primary | ICD-10-CM

## 2021-04-15 PROBLEM — Z88.9 HISTORY OF MULTIPLE ALLERGIES: Status: ACTIVE | Noted: 2021-04-15

## 2021-04-15 PROCEDURE — 99213 OFFICE O/P EST LOW 20 MIN: CPT | Performed by: INTERNAL MEDICINE

## 2021-04-15 NOTE — PATIENT INSTRUCTIONS
I recommend you continue Protonix once daily in the evening, and add Pepcid 20 mg once daily in the morning.  Please come by our lab, non-fasting, in the next 1 to 2 weeks, as we discussed.

## 2021-04-15 NOTE — TELEPHONE ENCOUNTER
----- Message from Cynthia Simental MD sent at 4/15/2021  1:40 PM EDT -----  Please call patient to schedule her physical.  OK for Hub to schedule Annual Physical in 3 mths non-fasting.

## 2021-04-15 NOTE — PROGRESS NOTES
Subjective       Key Ospina is a 45 y.o. female.     Chief Complaint   Patient presents with   • Abdominal Pain       History obtained from the patient.    You have chosen to receive care through a telehealth visit.  Do you consent to use a video/audio connection for your medical care today? Yes      Abdominal Pain  This is a new problem. Episode onset: 1 month ago. The onset quality is gradual. The problem occurs intermittently. The problem has been waxing and waning. Pain location: left periumbilical/LUQ. The pain is mild. The quality of the pain is aching. The abdominal pain does not radiate. Associated symptoms include weight loss (20 pounds intentional). Pertinent negatives include no constipation, diarrhea, dysuria, fever, frequency, hematochezia, hematuria, melena, nausea or vomiting. Associated symptoms comments: Pain is similar to when she was previously diagnosed with H. pylori.. The pain is relieved by nothing. She has tried nothing (Takes Protonix daily) for the symptoms. The treatment provided no relief. Prior workup: None. Her past medical history is significant for GERD.             Current Outpatient Medications on File Prior to Visit   Medication Sig Dispense Refill   • fexofenadine (ALLEGRA) 180 MG tablet Take 1 tablet by mouth daily.     • fluconazole (DIFLUCAN) 150 MG tablet TAKE 1 TABLET BY MOUTH FOR 1 DOSE, MAY REPEAT IN 4 DAYS 2 tablet 0   • pantoprazole (PROTONIX) 40 MG EC tablet TAKE ONE TABLET BY MOUTH DAILY 30 tablet 5   • tretinoin (RETIN-A) 0.025 % cream APPLY TOPICALLY TO THE APPROPRIATE AREA AS DIRECTED DAILY AS NEEDED 20 each 5   • ALPRAZolam (XANAX) 0.25 MG tablet TAKE ONE TABLET BY MOUTH DAILY AS NEEDED 20 tablet 0   • fluticasone (FLONASE) 50 MCG/ACT nasal spray 2 sprays into each nostril daily. Administer 1 spray in each nostril twice daily.       No current facility-administered medications on file prior to visit.       Current outpatient and discharge medications have  been reconciled for the patient.  Reviewed by: Cynthia Simental MD        The following portions of the patient's history were reviewed and updated as appropriate: allergies, current medications, past family history, past medical history, past social history, past surgical history and problem list.    Review of Systems   Constitutional: Positive for weight loss (20 pounds intentional). Negative for chills, fever and unexpected weight change.   HENT: Negative for trouble swallowing.    Respiratory: Negative for cough, shortness of breath and wheezing.    Cardiovascular: Negative for chest pain.   Gastrointestinal: Positive for abdominal pain. Negative for blood in stool, constipation, diarrhea, hematochezia, melena, nausea and vomiting.        Denies heartburn and acid reflux.   Genitourinary: Negative for dysuria, frequency, hematuria and urgency.   Skin: Negative for rash.         Objective       Temperature 98.2 °F (36.8 °C), temperature source Temporal, not currently breastfeeding.  There is no height or weight on file to calculate BMI.      Physical Exam  Vitals reviewed.   Constitutional:       Comments: Overweight.   Pulmonary:      Effort: Pulmonary effort is normal. No respiratory distress.   Abdominal:      Comments: Points to left mid abdomen when asked where her pain is.   Neurological:      Mental Status: She is alert.   Psychiatric:         Mood and Affect: Mood normal.         Assessment / Plan:  Diagnoses and all orders for this visit:    1. Left upper quadrant abdominal pain (Primary)  -     CBC & Differential; Future  -     Lipase; Future  -     Comprehensive Metabolic Panel; Future  -     Sedimentation Rate; Future  -     H. Pylori Antigen, Stool - Stool, Per Rectum; Future  -     Giardia Antigen - Stool, Per Rectum; Future  -     POC Occult Blood X 3, Stool; Future   Recommend she continue Protonix once daily in the evening, and add Pepcid 20 mg once daily in the morning.    2. Periumbilical  abdominal pain  -     CBC & Differential; Future  -     Lipase; Future  -     Comprehensive Metabolic Panel; Future  -     Sedimentation Rate; Future  -     H. Pylori Antigen, Stool - Stool, Per Rectum; Future  -     Giardia Antigen - Stool, Per Rectum; Future  -     POC Occult Blood X 3, Stool; Future   Recommend she continue Protonix once daily in the evening, and add Pepcid 20 mg once daily in the morning.      The patient agrees to return for labs.      Return in about 3 months (around 7/15/2021) for Annual physical, fasting, after 7/16/20.

## 2021-04-16 ENCOUNTER — LAB (OUTPATIENT)
Dept: INTERNAL MEDICINE | Facility: CLINIC | Age: 46
End: 2021-04-16

## 2021-04-16 DIAGNOSIS — R10.12 LEFT UPPER QUADRANT ABDOMINAL PAIN: ICD-10-CM

## 2021-04-16 DIAGNOSIS — R10.33 PERIUMBILICAL ABDOMINAL PAIN: ICD-10-CM

## 2021-04-16 LAB
ALBUMIN SERPL-MCNC: 3.9 G/DL (ref 3.5–5.2)
ALBUMIN/GLOB SERPL: 1.8 G/DL
ALP SERPL-CCNC: 97 U/L (ref 39–117)
ALT SERPL W P-5'-P-CCNC: 30 U/L (ref 1–33)
ANION GAP SERPL CALCULATED.3IONS-SCNC: 8.8 MMOL/L (ref 5–15)
AST SERPL-CCNC: 20 U/L (ref 1–32)
BASOPHILS # BLD MANUAL: 0.07 10*3/MM3 (ref 0–0.2)
BASOPHILS NFR BLD AUTO: 1 % (ref 0–1.5)
BILIRUB SERPL-MCNC: 0.2 MG/DL (ref 0–1.2)
BUN SERPL-MCNC: 18 MG/DL (ref 6–20)
BUN/CREAT SERPL: 45 (ref 7–25)
CALCIUM SPEC-SCNC: 9.5 MG/DL (ref 8.6–10.5)
CHLORIDE SERPL-SCNC: 105 MMOL/L (ref 98–107)
CO2 SERPL-SCNC: 23.2 MMOL/L (ref 22–29)
CREAT SERPL-MCNC: 0.4 MG/DL (ref 0.57–1)
DEPRECATED RDW RBC AUTO: 37.5 FL (ref 37–54)
EOSINOPHIL # BLD MANUAL: 0.07 10*3/MM3 (ref 0–0.4)
EOSINOPHIL NFR BLD MANUAL: 1 % (ref 0.3–6.2)
ERYTHROCYTE [DISTWIDTH] IN BLOOD BY AUTOMATED COUNT: 14.9 % (ref 12.3–15.4)
ERYTHROCYTE [SEDIMENTATION RATE] IN BLOOD: 12 MM/HR (ref 0–20)
GFR SERPL CREATININE-BSD FRML MDRD: >150 ML/MIN/1.73
GFR SERPL CREATININE-BSD FRML MDRD: >150 ML/MIN/1.73
GLOBULIN UR ELPH-MCNC: 2.2 GM/DL
GLUCOSE SERPL-MCNC: 101 MG/DL (ref 65–99)
HCT VFR BLD AUTO: 35.9 % (ref 34–46.6)
HGB BLD-MCNC: 11.3 G/DL (ref 12–15.9)
LIPASE SERPL-CCNC: 25 U/L (ref 13–60)
LYMPHOCYTES # BLD MANUAL: 1.71 10*3/MM3 (ref 0.7–3.1)
LYMPHOCYTES NFR BLD MANUAL: 1 % (ref 5–12)
LYMPHOCYTES NFR BLD MANUAL: 24.2 % (ref 19.6–45.3)
MCH RBC QN AUTO: 22.6 PG (ref 26.6–33)
MCHC RBC AUTO-ENTMCNC: 31.5 G/DL (ref 31.5–35.7)
MCV RBC AUTO: 71.9 FL (ref 79–97)
MONOCYTES # BLD AUTO: 0.07 10*3/MM3 (ref 0.1–0.9)
NEUTROPHILS # BLD AUTO: 5.15 10*3/MM3 (ref 1.7–7)
NEUTROPHILS NFR BLD MANUAL: 72.7 % (ref 42.7–76)
PLAT MORPH BLD: NORMAL
PLATELET # BLD AUTO: 335 10*3/MM3 (ref 140–450)
PMV BLD AUTO: 10.6 FL (ref 6–12)
POTASSIUM SERPL-SCNC: 3.9 MMOL/L (ref 3.5–5.2)
PROT SERPL-MCNC: 6.1 G/DL (ref 6–8.5)
RBC # BLD AUTO: 4.99 10*6/MM3 (ref 3.77–5.28)
RBC MORPH BLD: NORMAL
SODIUM SERPL-SCNC: 137 MMOL/L (ref 136–145)
WBC # BLD AUTO: 7.08 10*3/MM3 (ref 3.4–10.8)
WBC MORPH BLD: NORMAL

## 2021-04-16 PROCEDURE — 85025 COMPLETE CBC W/AUTO DIFF WBC: CPT | Performed by: INTERNAL MEDICINE

## 2021-04-16 PROCEDURE — 36415 COLL VENOUS BLD VENIPUNCTURE: CPT | Performed by: INTERNAL MEDICINE

## 2021-04-16 PROCEDURE — 85007 BL SMEAR W/DIFF WBC COUNT: CPT

## 2021-04-16 PROCEDURE — 80053 COMPREHEN METABOLIC PANEL: CPT | Performed by: INTERNAL MEDICINE

## 2021-04-16 PROCEDURE — 83690 ASSAY OF LIPASE: CPT | Performed by: INTERNAL MEDICINE

## 2021-04-16 PROCEDURE — 85652 RBC SED RATE AUTOMATED: CPT | Performed by: INTERNAL MEDICINE

## 2021-04-20 ENCOUNTER — LAB (OUTPATIENT)
Dept: INTERNAL MEDICINE | Facility: CLINIC | Age: 46
End: 2021-04-20

## 2021-04-20 DIAGNOSIS — R10.33 PERIUMBILICAL ABDOMINAL PAIN: ICD-10-CM

## 2021-04-20 DIAGNOSIS — R10.12 LEFT UPPER QUADRANT ABDOMINAL PAIN: ICD-10-CM

## 2021-04-20 PROCEDURE — 87338 HPYLORI STOOL AG IA: CPT | Performed by: INTERNAL MEDICINE

## 2021-04-20 PROCEDURE — 87329 GIARDIA AG IA: CPT | Performed by: INTERNAL MEDICINE

## 2021-04-21 LAB — G LAMBLIA AG STL QL IA: NEGATIVE

## 2021-04-22 LAB — H PYLORI AG STL QL IA: NEGATIVE

## 2021-06-21 NOTE — TELEPHONE ENCOUNTER
Terazol 3 works better and she has Diflucan. She is miserable and her annual 10/2021 with Dr. Sequeira

## 2021-07-14 DIAGNOSIS — K21.9 GASTROESOPHAGEAL REFLUX DISEASE, UNSPECIFIED WHETHER ESOPHAGITIS PRESENT: ICD-10-CM

## 2021-07-16 RX ORDER — PANTOPRAZOLE SODIUM 40 MG/1
TABLET, DELAYED RELEASE ORAL
Qty: 30 TABLET | Refills: 5 | Status: SHIPPED | OUTPATIENT
Start: 2021-07-16 | End: 2023-01-18

## 2021-07-16 NOTE — TELEPHONE ENCOUNTER
I have sent the prescription to the pharmacy, but the patient has not been seen in a year.  She is due for her physical, fasting.  Please schedule.

## 2021-08-04 ENCOUNTER — TELEPHONE (OUTPATIENT)
Dept: INTERNAL MEDICINE | Facility: CLINIC | Age: 46
End: 2021-08-04

## 2021-08-04 NOTE — TELEPHONE ENCOUNTER
Caller: Key Ospina    Relationship: Self    Best call back number: [phone]    What form or medical record are you requesting: MEDICAL NOTE FOR WORK THAT STATES THAT DUE TO THE PATIENTS UNDERLYING MEDICAL CONDITIONS SHE SHOULD NOT HAVE CONTACT WITH INDIVIDUALS THAT ARE NOT WEAREING A MASK DURING TIMES WHEN MASKS ARE SUGGESTED     Who is requesting this form or medical record from you: SCHOOL    How would you like to receive the form or medical records (pick-up, mail, fax): FAXED [phone] AND UPLOADED TO [employer]    Timeframe paperwork needed: AS SOON AS POSSIBLE     Additional notes: PLEASE CALL PATIENT TO LET HER KNOW 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

## 2021-08-05 NOTE — TELEPHONE ENCOUNTER
Key notified with detailed information that the letter is in front office for    If she has further questions she can call back

## 2022-02-23 ENCOUNTER — OFFICE VISIT (OUTPATIENT)
Dept: OBSTETRICS AND GYNECOLOGY | Facility: CLINIC | Age: 47
End: 2022-02-23

## 2022-02-23 VITALS
SYSTOLIC BLOOD PRESSURE: 120 MMHG | HEIGHT: 65 IN | DIASTOLIC BLOOD PRESSURE: 72 MMHG | WEIGHT: 145 LBS | BODY MASS INDEX: 24.16 KG/M2

## 2022-02-23 DIAGNOSIS — Z01.419 ROUTINE GYNECOLOGICAL EXAMINATION: Primary | ICD-10-CM

## 2022-02-23 DIAGNOSIS — Z12.39 ENCOUNTER FOR BREAST CANCER SCREENING USING NON-MAMMOGRAM MODALITY: ICD-10-CM

## 2022-02-23 PROCEDURE — 99396 PREV VISIT EST AGE 40-64: CPT | Performed by: OBSTETRICS & GYNECOLOGY

## 2022-02-23 NOTE — PROGRESS NOTES
GYN Annual Exam     CC - Here for annual exam. She has not had a hysterectomy and does have one or both ovaries.    Subjective   HPI  Key Ospina is a 46 y.o. female, , who presents for annual well woman exam.  She is premenopausal. Her last LMP was Patient's last menstrual period was 2022 (exact date)..  Periods are regular every 24 days, lasting 5 days.  Dysmenorrhea:mild, occurring premenstrually and first 1-2 days of flow.  Patient reports problems with: none.  Partner Status: Marital Status: .  New Partners since last visit: no Desires STD Screening: no    Last mammogram:  Few years ago  Last Completed Mammogram     This patient has no relevant Health Maintenance data.          Last colonoscopy :  Several years ago negative RTO @ 51yrs old  Last Completed Colonoscopy          Ordered - COLORECTAL CANCER SCREENING (COLONOSCOPY - Every 10 Years) Ordered on 4/15/2021    2013   Colonoscopy component of  COLONOSCOPY    2013  COLONOSCOPY (Done - normal)                BDS: was not indicated      Additional OB/GYN History     Current contraception: contraceptive methods: Vasectomy   Desires to: continue contraception  Last Pap : 19 negative -hpv  Last Completed Pap Smear          Ordered - PAP SMEAR (Every 3 Years) Ordered on 2019  Patient-Reported (Performed Externally) - Normal per patient report (GYN).  Reports normal pelvic exam 2020.    2014   Pap smear component of  PAP SMEAR    2014  Done - normal per pt    2012  Done - normal per pt              History of abnormal Pap smear: no  Family history of uterine, colon, breast, or ovarian cancer: no  Performs monthly Self-Breast Exam: yes  Parental Hip Fracture:   Exercises Regularly: yes  Feelings of Anxiety or Depression: no    Tobacco Usage?: No   OB History        2    Para   1    Term   1            AB        Living   2       SAB        IAB         "Ectopic        Molar        Multiple        Live Births   1                Health Maintenance   Topic Date Due   • MAMMOGRAM  05/14/2020   • LIPID PANEL  07/16/2021   • ANNUAL PHYSICAL  07/17/2021   • COVID-19 Vaccine (3 - Booster for Pfizer series) 08/01/2021   • INFLUENZA VACCINE  08/01/2021   • PAP SMEAR  05/14/2022   • Annual Gynecologic Pelvic and Breast Exam  02/24/2023   • TDAP/TD VACCINES (3 - Td or Tdap) 03/02/2023   • COLORECTAL CANCER SCREENING  05/16/2023   • HEPATITIS C SCREENING  Completed   • Pneumococcal Vaccine 0-64  Aged Out       The additional following portions of the patient's history were reviewed and updated as appropriate: problem list.    Review of Systems   All other systems reviewed and are negative.      I have reviewed and agree with the HPI, ROS, and historical information as entered above. Janeth Sequeira MD    Objective   /72   Ht 163.8 cm (64.5\")   Wt 65.8 kg (145 lb)   LMP 02/09/2022 (Exact Date)   Breastfeeding No   BMI 24.50 kg/m²     Physical Exam  Vitals and nursing note reviewed. Exam conducted with a chaperone present.   Constitutional:       Appearance: She is well-developed.   HENT:      Head: Normocephalic and atraumatic.   Neck:      Thyroid: No thyroid mass or thyromegaly.   Cardiovascular:      Rate and Rhythm: Normal rate and regular rhythm.      Heart sounds: No murmur heard.      Pulmonary:      Effort: Pulmonary effort is normal. No retractions.      Breath sounds: Normal breath sounds. No wheezing, rhonchi or rales.   Chest:      Chest wall: No mass or tenderness.   Breasts:      Right: Normal. No mass, nipple discharge, skin change or tenderness.      Left: Normal. No mass, nipple discharge, skin change or tenderness.       Abdominal:      General: Bowel sounds are normal.      Palpations: Abdomen is soft. Abdomen is not rigid. There is no mass.      Tenderness: There is no abdominal tenderness. There is no guarding.      Hernia: No hernia is present. " There is no hernia in the left inguinal area.   Genitourinary:     Labia:         Right: No rash, tenderness or lesion.         Left: No rash, tenderness or lesion.       Vagina: Normal. No vaginal discharge or lesions.      Cervix: No cervical motion tenderness, discharge, lesion or cervical bleeding.      Uterus: Normal. Not enlarged, not fixed and not tender.       Adnexa:         Right: No mass or tenderness.          Left: No mass or tenderness.        Rectum: No external hemorrhoid.   Musculoskeletal:      Cervical back: Normal range of motion. No muscular tenderness.   Neurological:      Mental Status: She is alert and oriented to person, place, and time.   Psychiatric:         Behavior: Behavior normal.         Assessment/Plan     Encounter Diagnoses   Name Primary?   • Routine gynecological examination Yes   • Encounter for breast cancer screening using non-mammogram modality          1. Recommended use of Vitamin D replacement and getting adequate calcium in her diet. (1500mg)  2. Reviewed monthly self breast exams.  Instructed to call with lumps, pain, or breast discharge.    3. Start yearly mammography  4. Reviewed HPV guidelines.  5. Reviewed exercise as a preventative health measures.   6. Return in about 1 year (around 2/23/2023), or if symptoms worsen or fail to improve.     Janeth Sequeira MD   02/23/2022

## 2022-03-14 ENCOUNTER — TELEPHONE (OUTPATIENT)
Dept: INTERNAL MEDICINE | Facility: CLINIC | Age: 47
End: 2022-03-14

## 2022-03-14 NOTE — TELEPHONE ENCOUNTER
Left message on phone # in message   Office # given   What day is she requesting appointment for   Do not see any other message regarding appointment .      Dr Simental   Please advise on

## 2022-03-14 NOTE — TELEPHONE ENCOUNTER
Caller: Key Ospina    Relationship: Self    Best call back number: [phone]     What is the best time to reach you: BETWEEN 12 PM - 1 PM OR AFTER 3:30 PM    Who are you requesting to speak with (clinical staff, provider,  specific staff member): STAFF    Do you know the name of the person who called: N/A    What was the call regarding: PATIENT IS NEEDING GOING OUT OF TOWN 03-25-22. SHE WOULD LIKE AN APPOINTMENT AFTER 3:30 PM. PATIENT STATES SOMEONE WAS SUPPOSED TO CALL TO SCHEDULE FOR TODAY. PATIENT IS ON WAITLIST    Do you require a callback: YES

## 2022-03-16 NOTE — TELEPHONE ENCOUNTER
Key states she is needing to be at clinic for anxiety and had called 2 weeks ago to get an appointment .  Notified her I did not see a message from 2 weeks ago.  She stated that she needs an appointment after school hours   Spoke with Dr Simental and she can see her on Monday 1/21/2022 at 330pm  For the anxiety .  If she had any other concerns that she would have to schedule another appointment to discuss those as Dr Simental was working her in for the appointment on 1/21/22.  Notified her she has not had a Physical since 7/16/2020 and that she needs to make a fasting appointment   Key notified  Verbal understanding given

## 2022-05-24 ENCOUNTER — TELEPHONE (OUTPATIENT)
Dept: OBSTETRICS AND GYNECOLOGY | Facility: CLINIC | Age: 47
End: 2022-05-24

## 2022-05-24 DIAGNOSIS — N89.8 VAGINAL DISCHARGE: Primary | ICD-10-CM

## 2022-05-24 RX ORDER — FLUCONAZOLE 150 MG/1
TABLET ORAL
Qty: 2 TABLET | Refills: 0 | Status: SHIPPED | OUTPATIENT
Start: 2022-05-24 | End: 2023-01-18

## 2022-05-24 NOTE — TELEPHONE ENCOUNTER
Pt called because she thinks she has a yeast infection and would like rx called in if possible. States Diflucan usually works, but usually she needs 3 in order to work. Please CB and advise if able to call in rx. 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

## 2022-05-24 NOTE — TELEPHONE ENCOUNTER
Patient thinks she has yeast infection. C/o vaginal itching and 'white' d/c. States had terazol that she used x3 days but causes irritation. She is requesting diflucan. Denies vaginal odor. Patient admits to using new pads recently and states can be very sensitive-unsure if this caused s/s.     Will discuss with KN and cb if any issues. Instructed if ok to treat and no relief from s/s will need eval in office. She estela.

## 2023-01-18 ENCOUNTER — OFFICE VISIT (OUTPATIENT)
Dept: INTERNAL MEDICINE | Facility: CLINIC | Age: 48
End: 2023-01-18
Payer: COMMERCIAL

## 2023-01-18 VITALS
SYSTOLIC BLOOD PRESSURE: 124 MMHG | HEIGHT: 65 IN | RESPIRATION RATE: 18 BRPM | WEIGHT: 130 LBS | BODY MASS INDEX: 21.66 KG/M2 | HEART RATE: 64 BPM | DIASTOLIC BLOOD PRESSURE: 80 MMHG | TEMPERATURE: 97.7 F

## 2023-01-18 DIAGNOSIS — Z23 NEED FOR TDAP VACCINATION: ICD-10-CM

## 2023-01-18 DIAGNOSIS — E78.49 OTHER HYPERLIPIDEMIA: ICD-10-CM

## 2023-01-18 DIAGNOSIS — K21.9 GASTROESOPHAGEAL REFLUX DISEASE, UNSPECIFIED WHETHER ESOPHAGITIS PRESENT: ICD-10-CM

## 2023-01-18 DIAGNOSIS — F41.1 GENERALIZED ANXIETY DISORDER: ICD-10-CM

## 2023-01-18 DIAGNOSIS — J47.9 BRONCHIECTASIS WITHOUT COMPLICATION: ICD-10-CM

## 2023-01-18 DIAGNOSIS — Z00.00 ENCOUNTER FOR HEALTH MAINTENANCE EXAMINATION IN ADULT: Primary | ICD-10-CM

## 2023-01-18 DIAGNOSIS — R73.03 PREDIABETES: ICD-10-CM

## 2023-01-18 DIAGNOSIS — I10 PRIMARY HYPERTENSION: ICD-10-CM

## 2023-01-18 LAB
BILIRUB BLD-MCNC: NEGATIVE MG/DL
CLARITY, POC: CLEAR
COLOR UR: YELLOW
EXPIRATION DATE: NORMAL
EXPIRATION DATE: NORMAL
GLUCOSE UR STRIP-MCNC: NEGATIVE MG/DL
HBA1C MFR BLD: 5.7 %
KETONES UR QL: NEGATIVE
LEUKOCYTE EST, POC: NEGATIVE
Lab: NORMAL
Lab: NORMAL
NITRITE UR-MCNC: NEGATIVE MG/ML
PH UR: 5 [PH] (ref 5–8)
PROT UR STRIP-MCNC: NEGATIVE MG/DL
RBC # UR STRIP: NEGATIVE /UL
SP GR UR: 1 (ref 1–1.03)
UROBILINOGEN UR QL: NORMAL

## 2023-01-18 PROCEDURE — 80061 LIPID PANEL: CPT | Performed by: INTERNAL MEDICINE

## 2023-01-18 PROCEDURE — 90715 TDAP VACCINE 7 YRS/> IM: CPT | Performed by: INTERNAL MEDICINE

## 2023-01-18 PROCEDURE — 99396 PREV VISIT EST AGE 40-64: CPT | Performed by: INTERNAL MEDICINE

## 2023-01-18 PROCEDURE — 83036 HEMOGLOBIN GLYCOSYLATED A1C: CPT | Performed by: INTERNAL MEDICINE

## 2023-01-18 PROCEDURE — 81003 URINALYSIS AUTO W/O SCOPE: CPT | Performed by: INTERNAL MEDICINE

## 2023-01-18 PROCEDURE — 99214 OFFICE O/P EST MOD 30 MIN: CPT | Performed by: INTERNAL MEDICINE

## 2023-01-18 PROCEDURE — 90471 IMMUNIZATION ADMIN: CPT | Performed by: INTERNAL MEDICINE

## 2023-01-18 PROCEDURE — 80050 GENERAL HEALTH PANEL: CPT | Performed by: INTERNAL MEDICINE

## 2023-01-18 RX ORDER — ALPRAZOLAM 0.25 MG/1
0.25 TABLET ORAL DAILY PRN
Qty: 30 TABLET | Refills: 0 | Status: SHIPPED | OUTPATIENT
Start: 2023-01-18

## 2023-01-18 NOTE — PROGRESS NOTES
Subjective     Chief Complaint:  Physical Exam.    History of Present Illness    History obtained from the patient.    The patient has Bronchiectasis which has been stable.   She has not needed to see Dr. Mon recently, and has not needed her ProAir inhaler.  She denies chest pain, shortness of breath,  ELENA, wheezing, cough.  She is on Allegra and Flonase for Seasonal Allergies.        Primary Care Cardiac Diagnostic [facility]: The patient is here today for a follow-up visit.  She was last seen on 7/16/2020.     Her Hypertension has been stable.  Medication (s): None.  Her Hyperlipidemia has been unstable.   Her LDL goal is < 130 last LDL was 172 and .   Medication(s): None. She has taken Crestor and Lipitor in the past and they both caused myalgias.   Her Prediabetes has been stable.  Medication:  None.      Interval Events: Last HgA1c on 7/16/2020 was 5.9.  She occasionally checks her blood pressure at home, and it generally runs 120-130/60-70.      Symptoms:   Denies chest pain, ELENA, orthopnea, PND, palpitations, syncope, lower extremity edema,  claudication, lightheadedness, and dizziness.  Associated Symptoms: Weight decreased 56 pounds intentionally since 7/16/20.   She reports fatigue (not sleeping).  No headache, polydipsia, polyuria, myalgias, arthralgias, memory loss, concentration problems, or focal neurologic deficits.       Lifestyle: She consumes a diverse and healthy diet.  Has cut down on sugar and is doing intermittent fasting. She exercises 4-5 times per week (Pilates, strength training, and some cardio).  Tobacco Use: Former smoker (1 ppd x 2 years).       GERD Follow-Up: The patient is being seen for follow-up of Gastroesophageal Reflux Disease, which is significantly improved.   Comorbid Illnesses:  None.  Interval Events: None.  Symptoms:  She denies abdominal pain, heartburn, acid reflux, nausea, vomiting, hematemesis, dysphagia, odynophagia, melena, hematochezia, early  "satiety, belching, and bloating.  Associated Symptoms: no chronic sore throat, hoarseness, cough, or wheezing  Medications: None.  She has been off Protonix for > 1 year.     Anxiety Disorder Follow-up: The patient is here for follow-up of Anxiety disorder, which is worse  Interval Events: Her mother  2 summers ago.  She states even though they were \"estranged\" it was still hard.  She is also recently gone through a divorce.  She has been going to counseling since May 2022.  Symptoms: Reports worsened anxiety and insomnia (mostly trouble getting to sleep).  Has some situational depression.  Denies panic attacks, anhedonia, memory loss, and concentration issues.    Associated Symptoms: Denies suicidal ideation and thoughts of self-harm.  Medication: None.  She has been off Alprazolam.       Key Ospina is a 47 y.o. female who presents for an Annual Physical.      PMH, PSH, SocHx, FamHx, Allergies, and Medications: Reviewed and updated.    Outpatient Medications Prior to Visit   Medication Sig Dispense Refill   • fexofenadine (ALLEGRA) 180 MG tablet Take 1 tablet by mouth daily.     • fluticasone (FLONASE) 50 MCG/ACT nasal spray 2 sprays into each nostril daily. Administer 1 spray in each nostril twice daily.     • tretinoin (RETIN-A) 0.025 % cream 1 application Daily.     • fluconazole (Diflucan) 150 MG tablet Take 1 tablet PO now and repeat 3 days. 2 tablet 0   • pantoprazole (PROTONIX) 40 MG EC tablet TAKE ONE TABLET BY MOUTH DAILY 30 tablet 5   • terconazole (TERAZOL 3) 0.8 % vaginal cream Insert per vagina qhs for 3 nights 1 each 0     No facility-administered medications prior to visit.       Immunization History   Administered Date(s) Administered   • COVID-19 (MODERNA) 1st, 2nd, 3rd Dose Only 2022   • COVID-19 (PFIZER) PURPLE CAP 2021, 2021   • FluMist 2-49yrs 10/25/2013   • PPD Test 2017   • Pneumococcal Polysaccharide (PPSV23) 2020   • Td 2005   • Tdap " 2013, 2023         Patient Active Problem List   Diagnosis   • Allergic rhinitis   • Elevated EBV antibody titer quantitative   • Gastroesophageal reflux disease   • Hyperlipidemia   • Hypertension   • Prediabetes   • Anxiety disorder   • Positive YOKO (antinuclear antibody)   • Bronchiectasis without complication (HCC)   • Other forms of systemic lupus erythematosus (HCC)   • History of multiple allergies   • EBV seropositivity       Health Habits:  Dental Exam. up to date  Eye Exam. up to date  Hearing Loss:  No  Exercise: 4-5 times/week.  Current exercise activities include: cardiovascular workout on exercise equipment, pilates and strength training  Diet: Healthy  Multivitamin: Yes, sometimes    Safe Driving:  Yes  Seat Belt:  Yes  Bike Helmet:  Yes  Skin Screening:  Yes  Sunscreen: Yes  SBE / CLAU: Yes  Sexual Activity:  Not currently  Birth Control:  N/A  STD Prevention:  N/A    Last Pap: 2022, normal (GYN)  Last Mammogram: 2019, category 1.  Last DEXA Scan: N/A  Last Colonoscopy: 13, normal.  Last PSA: N/A    Social:    Social History     Socioeconomic History   • Marital status:    • Number of children: 2   Tobacco Use   • Smoking status: Former     Packs/day: 0.50     Years: 2.00     Pack years: 1.00     Types: Cigarettes     Quit date: 1998     Years since quittin.0   • Smokeless tobacco: Never   Vaping Use   • Vaping Use: Never used   Substance and Sexual Activity   • Alcohol use: Yes     Comment: glass of wine or a cocktail, once per week   • Drug use: No   • Sexual activity: Not Currently     Partners: Male         Current Medical Providers:    Cynthia Simental MD (Internal Medicine / Pediatrics)    The [facility] providers who are involved in the care of this patient are listed above.         Review of Systems   Constitutional: Positive for fatigue. Negative for chills, fever and unexpected weight change.        No night sweats.    HENT: Negative for  congestion, ear discharge, ear pain, hearing loss, nosebleeds, postnasal drip, rhinorrhea, sinus pressure, sinus pain, sneezing, sore throat, tinnitus and voice change.         Denies snoring.   Eyes: Negative for photophobia, pain, discharge, redness, itching and visual disturbance.   Respiratory: Negative for cough, chest tightness, shortness of breath, wheezing and stridor.         No chest congestion.  No hemoptysis.   Cardiovascular: Negative for chest pain, palpitations and leg swelling.        No orthopnea, ELENA, or PND.  No claudication or syncope.   Gastrointestinal: Negative for abdominal pain, blood in stool, constipation, diarrhea, nausea, rectal pain and vomiting.        No melena.  No hematemesis.  No heartburn, dysphagia or odynophagia.  No early satiety, belching, or bloating.    Endocrine: Positive for cold intolerance. Negative for heat intolerance, polydipsia, polyphagia and polyuria.        No hair loss or dry skin.  No hot flashes.     Genitourinary: Positive for menstrual problem (has PMS, worsened anxity with it). Negative for difficulty urinating, dysuria, flank pain, frequency, hematuria, pelvic pain, urgency, vaginal bleeding and vaginal discharge.        No nocturia, incomplete emptying, or incontinence.   Musculoskeletal: Negative for arthralgias, back pain, gait problem, joint swelling, myalgias, neck pain and neck stiffness.        No joint stiffness.   Skin: Negative for rash.        No new skin lesions or changes in skin lesions. No breast pain or masses.  No nipple discharge or nipple inversion.   Neurological: Negative for dizziness, tremors, syncope, speech difficulty, weakness, light-headedness, numbness and headaches.        No tingling.  No memory loss.  No decreased concentration.   Hematological: Negative for adenopathy. Does not bruise/bleed easily.   Psychiatric/Behavioral: Positive for sleep disturbance. Negative for confusion, decreased concentration, self-injury and  "suicidal ideas. The patient is nervous/anxious.         Reports depression.           Objective     Vitals:    01/18/23 1311   BP: 124/80   BP Location: Left arm   Patient Position: Sitting   Pulse: 64   Resp: 18   Temp: 97.7 °F (36.5 °C)   TempSrc: Temporal   Weight: 59 kg (130 lb)   Height: 163.8 cm (64.5\")   PainSc: 0-No pain       Body mass index is 21.97 kg/m².    Physical Exam  Vitals and nursing note reviewed.   Constitutional:       Appearance: Normal appearance. She is well-developed and normal weight.   HENT:      Head: Normocephalic and atraumatic.      Right Ear: Tympanic membrane, ear canal and external ear normal.      Left Ear: Tympanic membrane, ear canal and external ear normal.      Mouth/Throat:      Mouth: Mucous membranes are moist. No oral lesions.      Pharynx: Oropharynx is clear.      Comments: Tonsils absent.  Eyes:      Extraocular Movements: Extraocular movements intact.      Conjunctiva/sclera: Conjunctivae normal.      Pupils: Pupils are equal, round, and reactive to light.      Comments: Fundi normal bilaterally.   Neck:      Thyroid: No thyromegaly.      Vascular: No carotid bruit.   Cardiovascular:      Rate and Rhythm: Normal rate and regular rhythm.      Pulses: Normal pulses.      Heart sounds: Normal heart sounds. No murmur heard.    No friction rub. No gallop.   Pulmonary:      Effort: Pulmonary effort is normal.      Breath sounds: Normal breath sounds.   Chest:   Breasts:     Right: No inverted nipple, mass, nipple discharge, skin change or tenderness.      Left: No inverted nipple, mass, nipple discharge, skin change or tenderness.   Abdominal:      General: Bowel sounds are normal. There is no distension or abdominal bruit.      Palpations: Abdomen is soft. There is no hepatomegaly, splenomegaly or mass.      Tenderness: There is no abdominal tenderness.   Genitourinary:     Comments:  and rectal exam deferred.  Musculoskeletal:         General: Normal range of motion.     "  Cervical back: Normal range of motion and neck supple.      Right lower leg: No edema.      Left lower leg: No edema.   Lymphadenopathy:      Cervical: No cervical adenopathy.      Upper Body:      Right upper body: No supraclavicular or axillary adenopathy.      Left upper body: No supraclavicular or axillary adenopathy.   Skin:     Findings: No rash.      Comments: No atypical skin lesions.   Neurological:      Mental Status: She is alert.      Motor: Motor function is intact. No abnormal muscle tone.      Coordination: Coordination is intact.      Gait: Gait is intact.      Deep Tendon Reflexes: Reflexes are normal and symmetric.   Psychiatric:         Mood and Affect: Mood normal.         PHQ-9 Depression Screening  Little interest or pleasure in doing things? 0-->not at all   Feeling down, depressed, or hopeless? 2-->more than half the days   Trouble falling or staying asleep, or sleeping too much? 3-->nearly every day   Feeling tired or having little energy? 3-->nearly every day (feeling tired, but good energy)   Poor appetite or overeating? 0-->not at all   Feeling bad about yourself - or that you are a failure or have let yourself or your family down? 1-->several days   Trouble concentrating on things, such as reading the newspaper or watching television? 1-->several days   Moving or speaking so slowly that other people could have noticed? Or the opposite - being so fidgety or restless that you have been moving around a lot more than usual? 0-->not at all   Thoughts that you would be better off dead, or of hurting yourself in some way? 0-->not at all   PHQ-9 Total Score 10   If you checked off any problems, how difficult have these problems made it for you to do your work, take care of things at home, or get along with other people? very difficult       PHQ-9 Total Score: 10          Counseling was given to patient for the following topics: appropriate exercise, healthy eating habits, disease prevention,  risk factors for cancer, importance of self breast exam and breast health, importance of immunizations, including risks and benefits, sun safety, seatbelt use, safe driving and safe sex. Also discussed the importance of regular dental and vision care, as well recommendation for a yearly screening skin exam after age 40.  Written information provided to patient on these topics and other health maintenance issues.    Results for orders placed or performed in visit on 01/18/23   POC Urinalysis Dipstick, Automated    Specimen: Urine   Result Value Ref Range    Color Yellow Yellow, Straw, Dark Yellow, Cindi    Clarity, UA Clear Clear    Specific Gravity  1.005 1.005 - 1.030    pH, Urine 5.0 5.0 - 8.0    Leukocytes Negative Negative    Nitrite, UA Negative Negative    Protein, POC Negative Negative mg/dL    Glucose, UA Negative Negative mg/dL    Ketones, UA Negative Negative    Urobilinogen, UA Normal Normal, 0.2 E.U./dL    Bilirubin Negative Negative    Blood, UA Negative Negative    Lot Number [id number]     Expiration Date 11/3,023    POC Glycosylated Hemoglobin (Hb A1C)    Specimen: Blood   Result Value Ref Range    Hemoglobin A1C 5.7 %    Lot Number [id number]     Expiration Date 11/7/24          Diagnoses and all orders for this visit:    1. Encounter for health maintenance examination in adult (Primary)  -     POC Urinalysis Dipstick, Automated  -     CBC & Differential  -     TSH  -     Comprehensive Metabolic Panel  -     Lipid Panel    2. Other hyperlipidemia  -     CBC & Differential  -     TSH  -     Comprehensive Metabolic Panel  -     Lipid Panel    3. Primary hypertension  -     POC Urinalysis Dipstick, Automated  -     CBC & Differential  -     TSH  -     Comprehensive Metabolic Panel  -     Lipid Panel    4. Prediabetes  -     POC Glycosylated Hemoglobin (Hb A1C)  -     CBC & Differential  -     TSH  -     Comprehensive Metabolic Panel  -     Lipid Panel    5. Gastroesophageal reflux disease, unspecified  whether esophagitis present   Stable, no medication.    6. Bronchiectasis without complication (HCC)   Stable, no medication.    7. Generalized anxiety disorder  -     ALPRAZolam (Xanax) 0.25 MG tablet; Take 1 tablet by mouth Daily As Needed for Anxiety.  Dispense: 30 tablet; Refill: 0- NEW     The patient was instructed in the side effects of the medication.  Risks of the potential for tolerance, dependence, and addiction were discussed.  The patient was instructed to take the lowest dosage of the medication, at the lowest frequency, and for the shortest period of time possible.  The patient was instructed not to receive controlled substances or narcotics from other doctors, and not to giveaway or sell the medication.    The patient was instructed to abstain from illicit drug use.  The patient was instructed to avoid alcohol while taking these medications.      Narcotics/controlled substance agreement, Aroldo report, and Urine Drug Screen were updated today if needed.    8. Need for Tdap vaccination  -     Tdap Vaccine Greater Than or Equal To 8yo IM      I recommended Hepatitis A vaccination, Influenza vaccination, and COVID-19 bivalent booster vaccination at our office or at the pharmacy.  The patient declined today.    Recommended the patient call call to schedule either a Colonoscopy or Cologuard (information given today).    The patient agrees to schedule her Mammogram.    Patient agrees to call if she would like to have a Cardiac CT Scan scheduled (information given today).        BMI is within normal parameters. No other follow-up for BMI required.            Return in about 1 year (around 1/18/2024) for Annual physical, fasting.

## 2023-01-18 NOTE — LETTER
"VACCINE CONSENT FORM      Patient Name:  Key Ospina    Patient :  1975      I/We have read, or have been explained, the information about the diseases and the vaccines listed below.  There was an opportunity to ask questions and any questions were answered satisfactorily.  I/We believe that I/we understand the benefits and risks of the vaccines(s) cited, and ask the vaccine(s) listed below be given to me/us or the person named above (for which i have authorized to make the request).      Vaccine(s) give:    Orders Placed This Encounter   Procedures   • Tdap Vaccine Greater Than or Equal To 8yo IM         Medicare patients:    The only vaccine covered under your medical benefit is flu/pneumonia and hepatitis B.  All other may be covered under your \"Part D\" prescription plan and requires you to go to a pharmacy with a Physician orders for administration.  If you still prefer to have it administered at our office, you will receive a bill for the vaccine and administration cost.               Patient Initials                     Patient or Parent/Guardian Signature                    Date        A copy of the appropriate Centers for Disease Control and Prevention Vaccine Information Statements has been provided.   "

## 2023-01-18 NOTE — PATIENT INSTRUCTIONS
I recommend Hepatitis A vaccination, Influenza vaccination, and COVID-19 bivalent booster vaccination at our office or at the pharmacy, as we discussed.    Please call to schedule either your Colonoscopy or Cologuard (information given today), as we discussed.    Please schedule your mammogram, as we discussed.    Please call if you would like to have a Cardiac CT Scan scheduled (information given today).    Health Maintenance, Female  Adopting a healthy lifestyle and getting preventive care are important in promoting health and wellness. Ask your health care provider about:  The right schedule for you to have regular tests and exams.  Things you can do on your own to prevent diseases and keep yourself healthy.  What should I know about diet, weight, and exercise?  Eat a healthy diet    Eat a diet that includes plenty of vegetables, fruits, low-fat dairy products, and lean protein.  Do not eat a lot of foods that are high in solid fats, added sugars, or sodium.  Maintain a healthy weight  Body mass index (BMI) is used to identify weight problems. It estimates body fat based on height and weight. Your health care provider can help determine your BMI and help you achieve or maintain a healthy weight.  Get regular exercise  Get regular exercise. This is one of the most important things you can do for your health. Most adults should:  Exercise for at least 150 minutes each week. The exercise should increase your heart rate and make you sweat (moderate-intensity exercise).  Do strengthening exercises at least twice a week. This is in addition to the moderate-intensity exercise.  Spend less time sitting. Even light physical activity can be beneficial.  Watch cholesterol and blood lipids  Have your blood tested for lipids and cholesterol at 20 years of age, then have this test every 5 years.  Have your cholesterol levels checked more often if:  Your lipid or cholesterol levels are high.  You are older than 40 years of  age.  You are at high risk for heart disease.  What should I know about cancer screening?  Depending on your health history and family history, you may need to have cancer screening at various ages. This may include screening for:  Breast cancer.  Cervical cancer.  Colorectal cancer.  Skin cancer.  Lung cancer.  What should I know about heart disease, diabetes, and high blood pressure?  Blood pressure and heart disease  High blood pressure causes heart disease and increases the risk of stroke. This is more likely to develop in people who have high blood pressure readings or are overweight.  Have your blood pressure checked:  Every 3-5 years if you are 18-39 years of age.  Every year if you are 40 years old or older.  Diabetes  Have regular diabetes screenings. This checks your fasting blood sugar level. Have the screening done:  Once every three years after age 40 if you are at a normal weight and have a low risk for diabetes.  More often and at a younger age if you are overweight or have a high risk for diabetes.  What should I know about preventing infection?  Hepatitis B  If you have a higher risk for hepatitis B, you should be screened for this virus. Talk with your health care provider to find out if you are at risk for hepatitis B infection.  Hepatitis C  Testing is recommended for:  Everyone born from 1945 through 1965.  Anyone with known risk factors for hepatitis C.  Sexually transmitted infections (STIs)  Get screened for STIs, including gonorrhea and chlamydia, if:  You are sexually active and are younger than 24 years of age.  You are older than 24 years of age and your health care provider tells you that you are at risk for this type of infection.  Your sexual activity has changed since you were last screened, and you are at increased risk for chlamydia or gonorrhea. Ask your health care provider if you are at risk.  Ask your health care provider about whether you are at high risk for HIV. Your health  care provider may recommend a prescription medicine to help prevent HIV infection. If you choose to take medicine to prevent HIV, you should first get tested for HIV. You should then be tested every 3 months for as long as you are taking the medicine.  Pregnancy  If you are about to stop having your period (premenopausal) and you may become pregnant, seek counseling before you get pregnant.  Take 400 to 800 micrograms (mcg) of folic acid every day if you become pregnant.  Ask for birth control (contraception) if you want to prevent pregnancy.  Osteoporosis and menopause  Osteoporosis is a disease in which the bones lose minerals and strength with aging. This can result in bone fractures. If you are 65 years old or older, or if you are at risk for osteoporosis and fractures, ask your health care provider if you should:  Be screened for bone loss.  Take a calcium or vitamin D supplement to lower your risk of fractures.  Be given hormone replacement therapy (HRT) to treat symptoms of menopause.  Follow these instructions at home:  Alcohol use  Do not drink alcohol if:  Your health care provider tells you not to drink.  You are pregnant, may be pregnant, or are planning to become pregnant.  If you drink alcohol:  Limit how much you have to:  0-1 drink a day.  Know how much alcohol is in your drink. In the [place] one drink equals one 12 oz bottle of beer (355 mL), one 5 oz glass of wine (148 mL), or one 1½ oz glass of hard liquor (44 mL).  Lifestyle  Do not use any products that contain nicotine or tobacco. These products include cigarettes, chewing tobacco, and vaping devices, such as e-cigarettes. If you need help quitting, ask your health care provider.  Do not use street drugs.  Do not share needles.  Ask your health care provider for help if you need support or information about quitting drugs.  General instructions  Schedule regular health, dental, and eye exams.  Stay current with your vaccines.  Tell your health  care provider if:  You often feel depressed.  You have ever been abused or do not feel safe at home.  Summary  Adopting a healthy lifestyle and getting preventive care are important in promoting health and wellness.  Follow your health care provider's instructions about healthy diet, exercising, and getting tested or screened for diseases.  Follow your health care provider's instructions on monitoring your cholesterol and blood pressure.  This information is not intended to replace advice given to you by your health care provider. Make sure you discuss any questions you have with your health care provider.  Document Revised: 05/09/2022 Document Reviewed: 05/09/2022  Elsevier Patient Education © 2022 [employer]

## 2023-01-19 LAB
ALBUMIN SERPL-MCNC: 4.6 G/DL (ref 3.5–5.2)
ALBUMIN/GLOB SERPL: 1.8 G/DL
ALP SERPL-CCNC: 50 U/L (ref 39–117)
ALT SERPL W P-5'-P-CCNC: 17 U/L (ref 1–33)
ANION GAP SERPL CALCULATED.3IONS-SCNC: 8.6 MMOL/L (ref 5–15)
AST SERPL-CCNC: 16 U/L (ref 1–32)
BASOPHILS # BLD AUTO: 0.04 10*3/MM3 (ref 0–0.2)
BASOPHILS NFR BLD AUTO: 0.6 % (ref 0–1.5)
BILIRUB SERPL-MCNC: 0.3 MG/DL (ref 0–1.2)
BUN SERPL-MCNC: 14 MG/DL (ref 6–20)
BUN/CREAT SERPL: 17.7 (ref 7–25)
CALCIUM SPEC-SCNC: 9.3 MG/DL (ref 8.6–10.5)
CHLORIDE SERPL-SCNC: 102 MMOL/L (ref 98–107)
CHOLEST SERPL-MCNC: 203 MG/DL (ref 0–200)
CO2 SERPL-SCNC: 27.4 MMOL/L (ref 22–29)
CREAT SERPL-MCNC: 0.79 MG/DL (ref 0.57–1)
DEPRECATED RDW RBC AUTO: 38 FL (ref 37–54)
EGFRCR SERPLBLD CKD-EPI 2021: 93.6 ML/MIN/1.73
EOSINOPHIL # BLD AUTO: 0.03 10*3/MM3 (ref 0–0.4)
EOSINOPHIL NFR BLD AUTO: 0.5 % (ref 0.3–6.2)
ERYTHROCYTE [DISTWIDTH] IN BLOOD BY AUTOMATED COUNT: 12.3 % (ref 12.3–15.4)
GLOBULIN UR ELPH-MCNC: 2.6 GM/DL
GLUCOSE SERPL-MCNC: 93 MG/DL (ref 65–99)
HCT VFR BLD AUTO: 40.4 % (ref 34–46.6)
HDLC SERPL-MCNC: 60 MG/DL (ref 40–60)
HGB BLD-MCNC: 13 G/DL (ref 12–15.9)
IMM GRANULOCYTES # BLD AUTO: 0.01 10*3/MM3 (ref 0–0.05)
IMM GRANULOCYTES NFR BLD AUTO: 0.2 % (ref 0–0.5)
LDLC SERPL CALC-MCNC: 133 MG/DL (ref 0–100)
LDLC/HDLC SERPL: 2.19 {RATIO}
LYMPHOCYTES # BLD AUTO: 2.46 10*3/MM3 (ref 0.7–3.1)
LYMPHOCYTES NFR BLD AUTO: 38.7 % (ref 19.6–45.3)
MCH RBC QN AUTO: 27.7 PG (ref 26.6–33)
MCHC RBC AUTO-ENTMCNC: 32.2 G/DL (ref 31.5–35.7)
MCV RBC AUTO: 86 FL (ref 79–97)
MONOCYTES # BLD AUTO: 0.3 10*3/MM3 (ref 0.1–0.9)
MONOCYTES NFR BLD AUTO: 4.7 % (ref 5–12)
NEUTROPHILS NFR BLD AUTO: 3.52 10*3/MM3 (ref 1.7–7)
NEUTROPHILS NFR BLD AUTO: 55.3 % (ref 42.7–76)
NRBC BLD AUTO-RTO: 0 /100 WBC (ref 0–0.2)
PLATELET # BLD AUTO: 302 10*3/MM3 (ref 140–450)
PMV BLD AUTO: 9.4 FL (ref 6–12)
POTASSIUM SERPL-SCNC: 4 MMOL/L (ref 3.5–5.2)
PROT SERPL-MCNC: 7.2 G/DL (ref 6–8.5)
RBC # BLD AUTO: 4.7 10*6/MM3 (ref 3.77–5.28)
SODIUM SERPL-SCNC: 138 MMOL/L (ref 136–145)
TRIGL SERPL-MCNC: 57 MG/DL (ref 0–150)
TSH SERPL DL<=0.05 MIU/L-ACNC: 0.55 UIU/ML (ref 0.27–4.2)
VLDLC SERPL-MCNC: 10 MG/DL (ref 5–40)
WBC NRBC COR # BLD: 6.36 10*3/MM3 (ref 3.4–10.8)

## 2023-02-28 ENCOUNTER — TELEPHONE (OUTPATIENT)
Dept: OBSTETRICS AND GYNECOLOGY | Facility: CLINIC | Age: 48
End: 2023-02-28

## 2023-02-28 NOTE — TELEPHONE ENCOUNTER
BEVERLEY HINES     [phone]    PT WOULD LIKE DR MARTELL TO SCHEDULE HER FOR AN ANNUAL AFTER MAY, DECLINED TO SEE AN APRN

## 2023-08-22 DIAGNOSIS — B37.9 CANDIDA INFECTION: ICD-10-CM

## 2023-08-22 RX ORDER — FLUCONAZOLE 150 MG/1
150 TABLET ORAL
Qty: 2 TABLET | Refills: 0 | OUTPATIENT
Start: 2023-08-22

## 2023-08-23 ENCOUNTER — TELEPHONE (OUTPATIENT)
Dept: OBSTETRICS AND GYNECOLOGY | Facility: CLINIC | Age: 48
End: 2023-08-23
Payer: COMMERCIAL

## 2023-08-23 DIAGNOSIS — B37.9 CANDIDA INFECTION: ICD-10-CM

## 2023-08-23 RX ORDER — FLUCONAZOLE 150 MG/1
150 TABLET ORAL
Qty: 2 TABLET | Refills: 0 | Status: SHIPPED | OUTPATIENT
Start: 2023-08-23

## 2023-10-27 ENCOUNTER — TELEPHONE (OUTPATIENT)
Dept: OBSTETRICS AND GYNECOLOGY | Facility: CLINIC | Age: 48
End: 2023-10-27
Payer: COMMERCIAL

## 2023-10-27 DIAGNOSIS — R10.2 PELVIC PAIN: ICD-10-CM

## 2023-10-27 DIAGNOSIS — N93.9 EPISODE OF HEAVY VAGINAL BLEEDING: Primary | ICD-10-CM

## 2023-10-27 RX ORDER — PROGESTERONE 200 MG/1
200 CAPSULE ORAL 2 TIMES DAILY
Qty: 12 CAPSULE | Refills: 0 | Status: SHIPPED | OUTPATIENT
Start: 2023-10-27

## 2023-10-27 NOTE — TELEPHONE ENCOUNTER
GYN Patient is calling because she is having bad cramping and she states she is bleeding thru a pad hourly.     Patient states if you can't get thru to her cell, Please call her Job at [phone] and request to speak to her.

## 2023-10-27 NOTE — TELEPHONE ENCOUNTER
Pt states that she has been having some BTB but believes she is on her period. She reports having severe cramping and has taken 4 advil to help with the pain and she is soaking through a pad every half hour. She states that it has gotten a little better but she is concerned since she has had 2 other episodes like this and would like to be seen. I let her know I will speak with LOS and let her know her recommendation. She VU

## 2023-10-27 NOTE — TELEPHONE ENCOUNTER
Per LOS pt can come in next week for US and EMB. She can start progesterone 200mg BID until she comes in or lysteda over the weekend. The patient chose to try the progesterone. I have sent this in per LOS and pt is scheduled for appt. Pt aware

## 2023-11-02 ENCOUNTER — OFFICE VISIT (OUTPATIENT)
Dept: OBSTETRICS AND GYNECOLOGY | Facility: CLINIC | Age: 48
End: 2023-11-02
Payer: COMMERCIAL

## 2023-11-02 VITALS
BODY MASS INDEX: 23.43 KG/M2 | HEIGHT: 65 IN | DIASTOLIC BLOOD PRESSURE: 70 MMHG | SYSTOLIC BLOOD PRESSURE: 124 MMHG | WEIGHT: 140.6 LBS

## 2023-11-02 DIAGNOSIS — N84.0 ENDOMETRIAL POLYP: ICD-10-CM

## 2023-11-02 DIAGNOSIS — N93.9 ABNORMAL UTERINE BLEEDING (AUB): Primary | ICD-10-CM

## 2023-11-02 NOTE — PROGRESS NOTES
Chief Complaint   Patient presents with    Endometrial biopsy    Abnormal uterine bleeding         Subjective   HPI  Key Ospina is a 48 y.o. female, , Patient's last menstrual period was 10/27/2023 (exact date).  She presents for an initial evaluation of Abnormal Uterine Bleeding and Dysmenorrhea. The patient was last seen  4 months ago by Janeth Sequeira MD. At that time she reported abnormal uterine bleeding that occurs post-coitus the 1st - 2nd day of her menstruals. Denies breakthrough bleeding. She had US performed today, after phone calls since July. The plan was lysteda, but she did not take it because the bleeding eventually got better. This has not been an issue in the past. The patient reports additional symptoms as none.      US was performed in [address].       Additional OB/GYN History   Last Pap :   Last Completed Pap Smear            PAP SMEAR (Every 3 Years) Next due on 2022  SCANNED - PAP SMEAR    2019  Patient-Reported (Performed Externally) - Normal per patient report (GYN).  Reports normal pelvic exam 2020.    2014   Pap smear component of  PAP SMEAR    2014  Done - normal per pt    2012  Done - normal per pt    Only the first 5 history entries have been loaded, but more history exists.                  History of abnormal Pap smear: no  Tobacco Usage?: No       Current Outpatient Medications:     ALPRAZolam (Xanax) 0.25 MG tablet, Take 1 tablet by mouth Daily As Needed for Anxiety., Disp: 30 tablet, Rfl: 0    fexofenadine (ALLEGRA) 180 MG tablet, Take 1 tablet by mouth Daily., Disp: , Rfl:     fluticasone (FLONASE) 50 MCG/ACT nasal spray, 2 sprays into the nostril(s) as directed by provider Daily. Administer 1 spray in each nostril twice daily., Disp: , Rfl:     tretinoin (RETIN-A) 0.025 % cream, 1 application  Daily., Disp: , Rfl:     fluconazole (Diflucan) 150 MG tablet, Take 1 tablet by mouth Every 3 (Three) Days.  (Patient not taking: Reported on 11/2/2023), Disp: 2 tablet, Rfl: 0    Progesterone (PROMETRIUM) 200 MG capsule, Take 1 capsule by mouth 2 (Two) Times a Day. (Patient not taking: Reported on 11/2/2023), Disp: 12 capsule, Rfl: 0     Past Medical History:   Diagnosis Date    Allergic rhinitis     Anemia     History of; fe Deficiency- intermittent    Anxiety disorder     Breast mass     Bronchiectasis without complication     Dx by high res CT scan 6/17.    ELENA (dyspnea on exertion) 04/27/2016    Neg Cardiac and pulmonary workup. RESOLVED    Elevated EBV antibody titer quantitative     Fatigue     Gastroesophageal reflux disease     Description: dx 2004    History of abdominal pain     Left upper quad    History of anemia     History of cardiovascular stress test 07/2013    Normal treadmill test. 12/04 and 9/10- normal exercise echo    History of chest pain     History of chest x-ray 01/11/2016    Unremarkable    History of chest x-ray 07/01/2013    The lungs are clear. The heart is normal. There is no active disease in the chest.    History of closed head injury     History of colonoscopy 05/16/2013    normal    History of esophagogastroduodenoscopy 05/24/2013    path c/w mild chronic gastritis and esophagitis    History of Helicobacter infection 04/2011    History of infectious mononucleosis     dx 8/10-chronically elevated EBV Titers    History of influenza 12/2014    History of PFTs 02/08/2016    Normal spirometry. ? lung volumes. Isolated mild reduction in diffusion capacity of unclear etiology.    History of PFTs 01/12/2016    Normal spirometry. Post bronchodilator test not clearly improved.    History of varicella     Hyperlipidemia     Description: nl Cardiac CRP/Homocysteine. hgh LDL-P    Hypertension     Description: (normal stress echo 12/04 and 9/10)  History of Cardiovascular Stress Test  · normal exercise echo 12/04 and 9/10. 7/13- normal treadmill Spect.    Myalgia     and myositis     Other forms of  "systemic lupus erythematosus     Positive YOKO (antinuclear antibody)     (and positive ds DNA)- sees Dr. Glynn ([facility]).    Prediabetes     Description: dx         Past Surgical History:   Procedure Laterality Date     SECTION      CHOLECYSTECTOMY      MOUTH SURGERY      TONSILLECTOMY         The additional following portions of the patient's history were reviewed and updated as appropriate: allergies, current medications, past family history, past medical history, past social history, past surgical history, and problem list.    Review of Systems   Constitutional: Negative.    HENT: Negative.     Eyes: Negative.    Respiratory: Negative.     Cardiovascular: Negative.    Gastrointestinal: Negative.    Endocrine: Negative.    Genitourinary: Negative.    Musculoskeletal: Negative.    Skin: Negative.    Allergic/Immunologic: Negative.    Neurological: Negative.    Hematological: Negative.    Psychiatric/Behavioral: Negative.         I have reviewed and agree with the HPI, ROS, and historical information as entered above. Janeth Sequeira MD      Objective   /70 (BP Location: Right arm, Patient Position: Sitting, Cuff Size: Adult)   Ht 163.8 cm (64.5\")   Wt 63.8 kg (140 lb 9.6 oz)   LMP 10/27/2023 (Exact Date)   BMI 23.76 kg/m²     Physical Exam  Constitutional:       Appearance: She is well-developed.   HENT:      Head: Normocephalic.   Eyes:      Conjunctiva/sclera: Conjunctivae normal.   Pulmonary:      Effort: Pulmonary effort is normal.   Psychiatric:         Behavior: Behavior normal.         Assessment & Plan     Assessment     Problem List Items Addressed This Visit    None  Visit Diagnoses       Abnormal uterine bleeding (AUB)    -  Primary    Relevant Orders    TISSUE EXAM, [facility] ([facility])    Endometrial polyp                  Plan     Episodic heavy menstrual bleeding usually associated with intercourse before her period.   I am not sure if actual causative " effect but this is when she has had the heaviest flow.  U/S today shows possible endometrial polyp.  I rec endometrial biopsy which she will return at her convenience for.    F/U imaging in 3-6 months for possible polyp if bx negative.       Janeth Sequeira MD  11/02/2023

## 2023-11-07 ENCOUNTER — PROCEDURE VISIT (OUTPATIENT)
Dept: OBSTETRICS AND GYNECOLOGY | Facility: CLINIC | Age: 48
End: 2023-11-07
Payer: COMMERCIAL

## 2023-11-07 VITALS
DIASTOLIC BLOOD PRESSURE: 80 MMHG | WEIGHT: 141.8 LBS | SYSTOLIC BLOOD PRESSURE: 118 MMHG | BODY MASS INDEX: 23.63 KG/M2 | HEIGHT: 65 IN

## 2023-11-07 DIAGNOSIS — N93.9 ABNORMAL UTERINE BLEEDING (AUB): ICD-10-CM

## 2023-11-07 DIAGNOSIS — Z76.89 ENCOUNTER FOR BIOPSY: Primary | ICD-10-CM

## 2023-11-07 LAB
B-HCG UR QL: NEGATIVE
B-HCG UR QL: NEGATIVE
EXPIRATION DATE: NORMAL
EXPIRATION DATE: NORMAL
INTERNAL NEGATIVE CONTROL: NORMAL
INTERNAL NEGATIVE CONTROL: NORMAL
INTERNAL POSITIVE CONTROL: NORMAL
INTERNAL POSITIVE CONTROL: NORMAL
Lab: NORMAL
Lab: NORMAL

## 2023-11-07 NOTE — PROGRESS NOTES
"     Gynecologic Procedure Note        Endometrial Biopsy      Indications: Key Ospina is a 48 y.o. , who presents today for endometrial biopsy.  The patient was noted to have Abnormal Uterine Bleeding.  Her LMP is Patient's last menstrual period was 10/27/2023 (exact date). . After being presented with the risk, benefits, and specific detail of the procedure, the patient wished to proceed.  Written consent was obtained from patient.   Urine pregnancy test was Negative. Patient does not have an allergy to betadine or shellfish.     Procedure Details     Time out: immediate members of the procedure team and patient agree to the following: correct patient, correct site, correct procedure to be performed. Janeth Sequeira MD      The patient was placed on the table in the dorsal lithotomy position.  She was draped in the appropriate manner.  A speculum was placed in the vagina.  The cervix was visualized and prepped with Betadine.  A tenaculum was placed on the anterior lip of the cervix for traction.  A small plastic 5 mm Pipelle syringe curette was inserted into the cervical canal.  The uterus was sounded to 8 cm's.  A vigorous four quadrant biopsy was performed, removing a small amount of tissue.  The tissue was placed in Formalin and sent to Pathology.  The patient tolerated the procedure well and she reported moderate cramping.  The tenaculum was removed from the cervix and the speculum was removed.  The patient was observed for 5 minutes.           Complications: none.     Procedures    Review of Systems  /80   Ht 163.8 cm (64.5\")   Wt 64.3 kg (141 lb 12.8 oz)   LMP 10/27/2023 (Exact Date)   BMI 23.96 kg/m²       Plan:  Orders Placed This Encounter   Procedures    POC Pregnancy, Urine     Order Specific Question:   Release to patient     Answer:   Routine Release [[id number]]    POC Pregnancy, Urine     Order Specific Question:   Release to patient     Answer:   Routine Release " [[id number]]       Problem List Items Addressed This Visit    None  Visit Diagnoses       Encounter for biopsy    -  Primary    Relevant Orders    POC Pregnancy, Urine (Completed)    Abnormal uterine bleeding (AUB)        Relevant Orders    POC Pregnancy, Urine (Completed)                Instructions  Call the office in 5 business days for biopsy results.  Patient instructed to call the office if develops a fever of 100.4 or greater, vaginal bleeding heavier than a period, foul vaginal discharge or pain.     Janeth Sequeira MD  11/07/2023

## 2023-11-08 LAB — REF LAB TEST METHOD: NORMAL

## 2023-12-14 ENCOUNTER — TELEPHONE (OUTPATIENT)
Dept: INTERNAL MEDICINE | Facility: CLINIC | Age: 48
End: 2023-12-14
Payer: COMMERCIAL

## 2023-12-14 NOTE — TELEPHONE ENCOUNTER
Patient returned call and is unable to get off work on such short notice.     Patient wants to know what you recommend if she cannot get in tomorrow and you are out next week. Does she need to schedule for when you return or should she go to [facility] or see someone else.

## 2023-12-14 NOTE — TELEPHONE ENCOUNTER
Caller: Key Ospina    Relationship: Self    Best call back number: [phone]     What was the call regarding:  PATIENT WAS SICK IN OCTOBER AND HAS NOT BEEN ABLE TO GET RID OF THE PRODUCTIVE COUGH, VERY COLORFUL MUCOUS, DRAINAGE IN GENERAL.     Is it okay if the provider responds through MyChart: YES      [facility] [id number] - [place], [place] - [address] - [phone]  - [phone] FX

## 2023-12-14 NOTE — TELEPHONE ENCOUNTER
Called patient and left vm.     There are multiple openings tomorrow for available appointments. Please see which one works best for patient. If she can't come tomorrow please schedule for next week.

## 2023-12-14 NOTE — TELEPHONE ENCOUNTER
PATIENT STATED THAT THE ONLY WAY THAT SHE CAN BE SEEN ON 12/15/23 IS FOR A MYCHART APPOINTMENT. SHE CANNOT GET OFF OF WORK TO COME INTO THE OFFICE.

## 2023-12-15 NOTE — TELEPHONE ENCOUNTER
Called patient, unable to reach, and left detailed vm.    Relay: If patient still wants to be seen today for a video visit we can work her in at 1:30pm. I have put her on the schedule. If that time does not work please schedule with another available provider or a find a time next week.

## 2023-12-15 NOTE — TELEPHONE ENCOUNTER
Tried contacting patient several times about appointment this afternoon.     Patient returned call at 1pm and stated that she tried to confirm appointment and still wished to be seen. Her appointment was put back on the schedule at original time 1:30pm.     Called patient to triage appointment and explain that we are behind schedule and it would be a wait till Dr. Simental was able to get on video visit. She was unable to do so because of her work schedule.     If patient calls back please schedule with a  provider next week that is available since Dr. Simental will be out of office.

## 2024-01-15 ENCOUNTER — TELEMEDICINE (OUTPATIENT)
Dept: INTERNAL MEDICINE | Facility: CLINIC | Age: 49
End: 2024-01-15
Payer: COMMERCIAL

## 2024-01-15 VITALS — WEIGHT: 141 LBS | BODY MASS INDEX: 23.83 KG/M2

## 2024-01-15 DIAGNOSIS — J01.90 ACUTE SINUSITIS, RECURRENCE NOT SPECIFIED, UNSPECIFIED LOCATION: Primary | ICD-10-CM

## 2024-01-15 PROCEDURE — 99213 OFFICE O/P EST LOW 20 MIN: CPT | Performed by: INTERNAL MEDICINE

## 2024-01-15 RX ORDER — FLUCONAZOLE 150 MG/1
150 TABLET ORAL ONCE
Qty: 2 TABLET | Refills: 1 | Status: SHIPPED | OUTPATIENT
Start: 2024-01-15 | End: 2024-01-15

## 2024-01-15 RX ORDER — AZITHROMYCIN 250 MG/1
TABLET, FILM COATED ORAL
Qty: 6 TABLET | Refills: 0 | Status: SHIPPED | OUTPATIENT
Start: 2024-01-15

## 2024-01-15 NOTE — PATIENT INSTRUCTIONS
Continue current over the counter medication, and plenty of fluids.  May also add Afrin nasal spray 2-3 times daily for a maximum of 3 to 5 days.

## 2024-01-15 NOTE — PROGRESS NOTES
Subjective       Key Ospina is a 48 y.o. female.     Chief Complaint   Patient presents with    Sinus Problem    Headache       History obtained from the patient.    The patient has chosen to receive care through a Telehealth visit.  The patient consented to use a video/audio connection for her medical care today.    The patient presents during the COVID-19 pandemic/federally declared state of public health emergency.  This service was conducted via Telehealth audio/visual by I-phone.  Connected to the patient using [email or web address].  This visit occurred with the Provider located at [facility] (@ [place], [place]) and the patient located at home in the [facility].  Other participants in this Telehealth visit included: None.    The patient states she was sick from October 2023 through the end of December 2023, but feels her symptoms completely resolved.    URI   This is a new problem. Episode onset: 1/3/24. The problem has been gradually worsening. Maximum temperature: low grade. The fever has been present for 1 to 2 days. Associated symptoms include congestion, coughing (mild wet), headaches, a plugged ear sensation, rhinorrhea (colored), sinus pain (jaw pain) and a sore throat (@ night). Pertinent negatives include no abdominal pain, chest pain, diarrhea, ear pain, joint pain, joint swelling, nausea, neck pain, rash, swollen glands, vomiting or wheezing. Treatments tried: Allegra and Flonase daily, added Advil and Sudafed.  Also using a Emily pot. The treatment provided mild relief.      There is no known exposure to Influenza, COVID-19, RSV, or Strep.  However she is a  and states the kids are sick all the time.  She has not done a home COVID test.    The following portions of the patient's history were reviewed and updated as appropriate: allergies, current medications, past family history, past medical history, past social  history, past surgical history, and problem list.      Review of Systems   Constitutional:  Positive for chills, fatigue (chronic) and fever.   HENT:  Positive for congestion, postnasal drip (mild), rhinorrhea (colored), sinus pressure, sinus pain (jaw pain) and sore throat (@ night). Negative for ear pain.    Respiratory:  Positive for cough (mild wet). Negative for wheezing.    Cardiovascular:  Negative for chest pain.   Gastrointestinal:  Negative for abdominal pain, diarrhea, nausea and vomiting.   Musculoskeletal:  Negative for arthralgias, joint pain, myalgias, neck pain and neck stiffness.   Skin:  Negative for rash.   Neurological:  Positive for headaches.   Hematological:  Negative for adenopathy.           Objective     Weight 64 kg (141 lb), not currently breastfeeding.    Physical Exam  Vitals reviewed.   Constitutional:       Appearance: She is normal weight.   Pulmonary:      Effort: Pulmonary effort is normal. No respiratory distress.      Comments: Dry cough present.  Neurological:      Mental Status: She is alert.   Psychiatric:         Mood and Affect: Mood normal.           Assessment & Plan   Diagnoses and all orders for this visit:    1. Acute sinusitis, recurrence not specified, unspecified location (Primary)  -     azithromycin (Zithromax Z-Papa) 250 MG tablet; Take 2 tablets the first day, then 1 tablet daily for 4 days.  Dispense: 6 tablet; Refill: 0  -     fluconazole (Diflucan) 150 MG tablet; Take 1 tablet by mouth 1 (One) Time for 1 dose. May repeat after 4 days.  Dispense: 2 tablet; Refill: 1  The patient was instructed to continue current over the counter medication, and plenty of fluids.    May also add Afrin nasal spray 2-3 times daily for a maximum of 3 to 5 days.      Return if symptoms worsen or fail to improve.

## 2024-02-19 ENCOUNTER — TELEPHONE (OUTPATIENT)
Dept: INTERNAL MEDICINE | Facility: CLINIC | Age: 49
End: 2024-02-19
Payer: COMMERCIAL

## 2024-02-19 DIAGNOSIS — N76.0 ACUTE VAGINITIS: Primary | ICD-10-CM

## 2024-02-19 RX ORDER — FLUCONAZOLE 150 MG/1
150 TABLET ORAL ONCE
Qty: 2 TABLET | Refills: 1 | Status: SHIPPED | OUTPATIENT
Start: 2024-02-19 | End: 2024-02-19

## 2024-02-19 NOTE — TELEPHONE ENCOUNTER
Called patient, unable to reach, and left detailed vm.    Relay: Prescription has been sent into the pharmacy.

## 2024-02-19 NOTE — TELEPHONE ENCOUNTER
Caller: Key Ospina    Relationship: Self    Best call back number:     [phone]     What medication are you requesting: DIFLUCAN     What are your current symptoms: YEAST INFECTION     How long have you been experiencing symptoms: ABOUT 4 DAYS     Have you had these symptoms before:    [x] Yes  [] No    Have you been treated for these symptoms before:   [x] Yes  [] No    If a prescription is needed, what is your preferred pharmacy and phone number:  [facility]     Additional notes:  PATIENT FINISHED A ROUND OF ANTIBIOTIC ABOUT 1 MONTH AGO     PATIENT SAID OVER THE COUNTER MEDICATION DOES NOT WORK WELL FOR HER

## 2024-07-09 ENCOUNTER — TELEPHONE (OUTPATIENT)
Dept: OBSTETRICS AND GYNECOLOGY | Facility: CLINIC | Age: 49
End: 2024-07-09

## 2024-07-09 NOTE — TELEPHONE ENCOUNTER
Caller: Key Ospina    Relationship to patient: Self    Best call back number: [phone]    Type of visit: ANNUAL    If rescheduling, when is the original appointment: 7/11/24     Additional notes:PATIENT HAD TO RESCHEDULE HER ANNUAL VISIT WITH DR MARTELL DUE TO WORK AND SHE IS WANTING TO KNOW IF THERE ARE ANY SOONER APPTS AVAILABLE BEFORE 2/26/24 WITH DR MATRELL. PATIENT WOULD LIKE A CALL BACK, PLEASE. IT IS OK TO LEAVE A DETAILED VOICEMAIL IF SHE ISN'T ABLE TO ANSWER.

## 2024-07-23 NOTE — TELEPHONE ENCOUNTER
Caller: Bhavesh Key ECHAVARRIA    Relationship: Self    Best call back number:      Requested Prescriptions:   Requested Prescriptions     Pending Prescriptions Disp Refills    tretinoin (RETIN-A) 0.025 % cream       Si Application Daily.        Pharmacy where request should be sent: [facility] [id number] [address] - [phone]  - [phone] FX     Last office visit with prescribing clinician: 2023   Last telemedicine visit with prescribing clinician: Visit date not found   Next office visit with prescribing clinician: 2024     Additional details provided by patient:  PATIENT IS OUT OF MEDICATION    Does the patient have less than a 3 day supply:  [x] Yes  [] No      Zoe Tesfaye Rep   24 10:19 EDT

## 2024-08-12 ENCOUNTER — TELEPHONE (OUTPATIENT)
Dept: INTERNAL MEDICINE | Facility: CLINIC | Age: 49
End: 2024-08-12
Payer: COMMERCIAL

## 2024-08-12 DIAGNOSIS — R21 RASH: Primary | ICD-10-CM

## 2024-08-12 RX ORDER — TRIAMCINOLONE ACETONIDE 0.25 MG/G
1 OINTMENT TOPICAL 3 TIMES DAILY
Qty: 30 G | Refills: 1 | Status: SHIPPED | OUTPATIENT
Start: 2024-08-12 | End: 2024-08-19

## 2024-08-12 NOTE — TELEPHONE ENCOUNTER
RELAY:   There is nothing over-the-counter, but I can send a prescription in for triamcinolone ointment, which is a topical steroid.

## 2024-08-12 NOTE — TELEPHONE ENCOUNTER
There is nothing over-the-counter, but I can send a prescription in for triamcinolone ointment, which is a topical steroid.

## 2024-08-12 NOTE — TELEPHONE ENCOUNTER
Called patient and read providers message. She stated that she is currently taking Allegra daily.   She asked if there was anything else topical she can try? She has had  a difficult time taking oral steroids In the past and wanted to know if there was an alternative she could try.

## 2024-08-12 NOTE — TELEPHONE ENCOUNTER
Caller: Key Ospina    Relationship to patient: Self    Best call back number: [phone]     Patient is needing: A CALL BACK ABOUT MULTIPLE INSECT STINGS. PATIENT STATES THAT IT IS VERY ITCHY, TRIED CORTIZONE AND BENADRYL CREAMS, NOT HELPING MUCH. WOULD LIKE TO KNOW WHAT ELSE SHE CAN TRY. SKIN IS VERY ANGRY.     [facility] [id number] - [facility] [address] - [phone]  - [phone] FX

## 2024-08-12 NOTE — TELEPHONE ENCOUNTER
Patient has returned call, I have relayed provider's message, patient agreed on Rx ointment, please send to [facility] pharmacy on file.

## 2024-09-18 ENCOUNTER — TELEPHONE (OUTPATIENT)
Dept: INTERNAL MEDICINE | Facility: CLINIC | Age: 49
End: 2024-09-18
Payer: COMMERCIAL

## 2024-09-18 DIAGNOSIS — N76.0 ACUTE VAGINITIS: Primary | ICD-10-CM

## 2024-09-18 RX ORDER — FLUCONAZOLE 150 MG/1
150 TABLET ORAL ONCE
Qty: 2 TABLET | Refills: 1 | Status: SHIPPED | OUTPATIENT
Start: 2024-09-18 | End: 2024-09-18

## 2024-11-18 ENCOUNTER — TELEPHONE (OUTPATIENT)
Dept: INTERNAL MEDICINE | Facility: CLINIC | Age: 49
End: 2024-11-18
Payer: COMMERCIAL

## 2024-11-18 ENCOUNTER — TELEPHONE (OUTPATIENT)
Dept: OBSTETRICS AND GYNECOLOGY | Facility: CLINIC | Age: 49
End: 2024-11-18
Payer: COMMERCIAL

## 2024-11-18 RX ORDER — FLUCONAZOLE 150 MG/1
TABLET ORAL
Qty: 3 TABLET | Refills: 0 | Status: SHIPPED | OUTPATIENT
Start: 2024-11-18

## 2024-11-18 NOTE — TELEPHONE ENCOUNTER
Caller: Key Ospina    Relationship: Self    Best call back number: [phone]      Who are you requesting to speak with (clinical staff, DR. MARTELL      What was the call regarding: PATIENT ADVISED THAT SHE HAS A YEAST INFECTION, WOULD LIKE MIHAELA CALLED IN TO THE [facility] AT   [address] [place] [address],  [phone], PLEASE ASSIST.

## 2024-11-18 NOTE — TELEPHONE ENCOUNTER
Caller: Key Ospina    Relationship: Self    Best call back number: [phone]    What medication are you requesting: DIFIUCAN    What are your current symptoms: YEAST INFECTION     How long have you been experiencing symptoms: 72 HOURS     Have you had these symptoms before:    [x] Yes  [] No    Have you been treated for these symptoms before:   [x] Yes  [] No    If a prescription is needed, what is your preferred pharmacy and phone number:  [facility]  [phone]    Additional notes: NEEDS MEDICATION ASAP. THE PATIENT IS VERY UNCOMFORTABLE

## 2024-11-18 NOTE — TELEPHONE ENCOUNTER
Pt reports vaginal itch, burning to labia and mild amount of white discharge since the end of last week.  Pt requesting Diflucan for treatment.  Referred to ERIKA Ventura NP for refill of medication as pt has been treated last for same symptoms and medication in 8/23.  Pharmacy verified and ERIKA Ventura approved #3 diflucan to be ordered and sent to pharmacy.  Discussed with patient she verbalized understanding to take one tablet and repeat in 3 days as needed.  Pt also asked about changing annual exam date with MD Sequeira from 12/6 to 12/10 and appt. made for 11:00 in [place] at pt request.  I told her I would notify her of the change of appt. [employer]

## 2024-11-26 ENCOUNTER — TELEPHONE (OUTPATIENT)
Dept: OBSTETRICS AND GYNECOLOGY | Facility: CLINIC | Age: 49
End: 2024-11-26
Payer: COMMERCIAL

## 2024-11-26 DIAGNOSIS — N76.0 ACUTE VAGINITIS: Primary | ICD-10-CM

## 2024-11-26 RX ORDER — TERCONAZOLE 4 MG/G
1 CREAM VAGINAL NIGHTLY
Qty: 45 G | Refills: 0 | Status: SHIPPED | OUTPATIENT
Start: 2024-11-26 | End: 2024-12-03

## 2024-11-26 NOTE — TELEPHONE ENCOUNTER
PT is calling because she had a bad yeast infection last week and was prescribed diflucan - PT states its about 80% gone but not all the way -she normally has to get Terazol to knock it all way the out and she would like a script of Terazol.

## 2024-12-16 ENCOUNTER — OFFICE VISIT (OUTPATIENT)
Dept: INTERNAL MEDICINE | Facility: CLINIC | Age: 49
End: 2024-12-16
Payer: COMMERCIAL

## 2024-12-16 VITALS
HEIGHT: 65 IN | SYSTOLIC BLOOD PRESSURE: 124 MMHG | TEMPERATURE: 97.4 F | HEART RATE: 70 BPM | DIASTOLIC BLOOD PRESSURE: 80 MMHG | WEIGHT: 161 LBS | BODY MASS INDEX: 26.82 KG/M2

## 2024-12-16 DIAGNOSIS — F41.1 GENERALIZED ANXIETY DISORDER: ICD-10-CM

## 2024-12-16 DIAGNOSIS — Z00.00 ENCOUNTER FOR HEALTH MAINTENANCE EXAMINATION IN ADULT: Primary | ICD-10-CM

## 2024-12-16 DIAGNOSIS — K21.9 GASTROESOPHAGEAL REFLUX DISEASE, UNSPECIFIED WHETHER ESOPHAGITIS PRESENT: ICD-10-CM

## 2024-12-16 DIAGNOSIS — J47.9 BRONCHIECTASIS WITHOUT COMPLICATION: ICD-10-CM

## 2024-12-16 DIAGNOSIS — Z79.899 HIGH RISK MEDICATION USE: ICD-10-CM

## 2024-12-16 DIAGNOSIS — Z12.11 SCREENING FOR COLON CANCER: ICD-10-CM

## 2024-12-16 DIAGNOSIS — R73.03 PREDIABETES: ICD-10-CM

## 2024-12-16 DIAGNOSIS — E78.5 DYSLIPIDEMIA: ICD-10-CM

## 2024-12-16 LAB
ALBUMIN SERPL-MCNC: 3.9 G/DL (ref 3.5–5.2)
ALBUMIN/GLOB SERPL: 1.5 G/DL
ALP SERPL-CCNC: 47 U/L (ref 39–117)
ALT SERPL W P-5'-P-CCNC: 10 U/L (ref 1–33)
ANION GAP SERPL CALCULATED.3IONS-SCNC: 9.5 MMOL/L (ref 5–15)
AST SERPL-CCNC: 20 U/L (ref 1–32)
BASOPHILS # BLD AUTO: 0.05 10*3/MM3 (ref 0–0.2)
BASOPHILS NFR BLD AUTO: 0.9 % (ref 0–1.5)
BILIRUB SERPL-MCNC: 0.3 MG/DL (ref 0–1.2)
BUN SERPL-MCNC: 17 MG/DL (ref 6–20)
BUN/CREAT SERPL: 18.9 (ref 7–25)
CALCIUM SPEC-SCNC: 8.7 MG/DL (ref 8.6–10.5)
CHLORIDE SERPL-SCNC: 104 MMOL/L (ref 98–107)
CHOLEST SERPL-MCNC: 235 MG/DL (ref 0–200)
CO2 SERPL-SCNC: 24.5 MMOL/L (ref 22–29)
CREAT SERPL-MCNC: 0.9 MG/DL (ref 0.57–1)
DEPRECATED RDW RBC AUTO: 38 FL (ref 37–54)
EGFRCR SERPLBLD CKD-EPI 2021: 79 ML/MIN/1.73
EOSINOPHIL # BLD AUTO: 0.24 10*3/MM3 (ref 0–0.4)
EOSINOPHIL NFR BLD AUTO: 4.3 % (ref 0.3–6.2)
ERYTHROCYTE [DISTWIDTH] IN BLOOD BY AUTOMATED COUNT: 12 % (ref 12.3–15.4)
EXPIRATION DATE: NORMAL
GLOBULIN UR ELPH-MCNC: 2.6 GM/DL
GLUCOSE SERPL-MCNC: 100 MG/DL (ref 65–99)
HBA1C MFR BLD: 5.7 % (ref 4.5–5.7)
HCT VFR BLD AUTO: 40.8 % (ref 34–46.6)
HDLC SERPL-MCNC: 52 MG/DL (ref 40–60)
HGB BLD-MCNC: 13.7 G/DL (ref 12–15.9)
IMM GRANULOCYTES # BLD AUTO: 0.02 10*3/MM3 (ref 0–0.05)
IMM GRANULOCYTES NFR BLD AUTO: 0.4 % (ref 0–0.5)
LDLC SERPL CALC-MCNC: 168 MG/DL (ref 0–100)
LDLC/HDLC SERPL: 3.18 {RATIO}
LYMPHOCYTES # BLD AUTO: 1.65 10*3/MM3 (ref 0.7–3.1)
LYMPHOCYTES NFR BLD AUTO: 29.3 % (ref 19.6–45.3)
Lab: NORMAL
MCH RBC QN AUTO: 29.8 PG (ref 26.6–33)
MCHC RBC AUTO-ENTMCNC: 33.6 G/DL (ref 31.5–35.7)
MCV RBC AUTO: 88.9 FL (ref 79–97)
MONOCYTES # BLD AUTO: 0.26 10*3/MM3 (ref 0.1–0.9)
MONOCYTES NFR BLD AUTO: 4.6 % (ref 5–12)
NEUTROPHILS NFR BLD AUTO: 3.42 10*3/MM3 (ref 1.7–7)
NEUTROPHILS NFR BLD AUTO: 60.5 % (ref 42.7–76)
NRBC BLD AUTO-RTO: 0 /100 WBC (ref 0–0.2)
PLATELET # BLD AUTO: 266 10*3/MM3 (ref 140–450)
PMV BLD AUTO: 9.5 FL (ref 6–12)
POTASSIUM SERPL-SCNC: 4 MMOL/L (ref 3.5–5.2)
PROT SERPL-MCNC: 6.5 G/DL (ref 6–8.5)
RBC # BLD AUTO: 4.59 10*6/MM3 (ref 3.77–5.28)
SODIUM SERPL-SCNC: 138 MMOL/L (ref 136–145)
TRIGL SERPL-MCNC: 88 MG/DL (ref 0–150)
TSH SERPL DL<=0.05 MIU/L-ACNC: 1.39 UIU/ML (ref 0.27–4.2)
VLDLC SERPL-MCNC: 15 MG/DL (ref 5–40)
WBC NRBC COR # BLD AUTO: 5.64 10*3/MM3 (ref 3.4–10.8)

## 2024-12-16 PROCEDURE — 83036 HEMOGLOBIN GLYCOSYLATED A1C: CPT | Performed by: INTERNAL MEDICINE

## 2024-12-16 PROCEDURE — 99396 PREV VISIT EST AGE 40-64: CPT | Performed by: INTERNAL MEDICINE

## 2024-12-16 PROCEDURE — 99214 OFFICE O/P EST MOD 30 MIN: CPT | Performed by: INTERNAL MEDICINE

## 2024-12-16 PROCEDURE — 80050 GENERAL HEALTH PANEL: CPT | Performed by: INTERNAL MEDICINE

## 2024-12-16 PROCEDURE — 80061 LIPID PANEL: CPT | Performed by: INTERNAL MEDICINE

## 2024-12-16 RX ORDER — ALPRAZOLAM 0.25 MG/1
0.25 TABLET ORAL DAILY PRN
Qty: 30 TABLET | Refills: 0 | Status: SHIPPED | OUTPATIENT
Start: 2024-12-16

## 2024-12-16 NOTE — PATIENT INSTRUCTIONS
I recommend the Influenza vaccine, the COVID-19 bivalent vaccine, and the Hepatitis A vaccine, in our office or at the pharmacy, as we discussed.    Health Maintenance, Female  Adopting a healthy lifestyle and getting preventive care are important in promoting health and wellness. Ask your health care provider about:  The right schedule for you to have regular tests and exams.  Things you can do on your own to prevent diseases and keep yourself healthy.  What should I know about diet, weight, and exercise?  Eat a healthy diet    Eat a diet that includes plenty of vegetables, fruits, low-fat dairy products, and lean protein.  Do not eat a lot of foods that are high in solid fats, added sugars, or sodium.  Maintain a healthy weight  Body mass index (BMI) is used to identify weight problems. It estimates body fat based on height and weight. Your health care provider can help determine your BMI and help you achieve or maintain a healthy weight.  Get regular exercise  Get regular exercise. This is one of the most important things you can do for your health. Most adults should:  Exercise for at least 150 minutes each week. The exercise should increase your heart rate and make you sweat (moderate-intensity exercise).  Do strengthening exercises at least twice a week. This is in addition to the moderate-intensity exercise.  Spend less time sitting. Even light physical activity can be beneficial.  Watch cholesterol and blood lipids  Have your blood tested for lipids and cholesterol at 20 years of age, then have this test every 5 years.  Have your cholesterol levels checked more often if:  Your lipid or cholesterol levels are high.  You are older than 40 years of age.  You are at high risk for heart disease.  What should I know about cancer screening?  Depending on your health history and family history, you may need to have cancer screening at various ages. This may include screening for:  Breast cancer.  Cervical  cancer.  Colorectal cancer.  Skin cancer.  Lung cancer.  What should I know about heart disease, diabetes, and high blood pressure?  Blood pressure and heart disease  High blood pressure causes heart disease and increases the risk of stroke. This is more likely to develop in people who have high blood pressure readings or are overweight.  Have your blood pressure checked:  Every 3-5 years if you are 18-39 years of age.  Every year if you are 40 years old or older.  Diabetes  Have regular diabetes screenings. This checks your fasting blood sugar level. Have the screening done:  Once every three years after age 40 if you are at a normal weight and have a low risk for diabetes.  More often and at a younger age if you are overweight or have a high risk for diabetes.  What should I know about preventing infection?  Hepatitis B  If you have a higher risk for hepatitis B, you should be screened for this virus. Talk with your health care provider to find out if you are at risk for hepatitis B infection.  Hepatitis C  Testing is recommended for:  Everyone born from 1945 through 1965.  Anyone with known risk factors for hepatitis C.  Sexually transmitted infections (STIs)  Get screened for STIs, including gonorrhea and chlamydia, if:  You are sexually active and are younger than 24 years of age.  You are older than 24 years of age and your health care provider tells you that you are at risk for this type of infection.  Your sexual activity has changed since you were last screened, and you are at increased risk for chlamydia or gonorrhea. Ask your health care provider if you are at risk.  Ask your health care provider about whether you are at high risk for HIV. Your health care provider may recommend a prescription medicine to help prevent HIV infection. If you choose to take medicine to prevent HIV, you should first get tested for HIV. You should then be tested every 3 months for as long as you are taking the  medicine.  Pregnancy  If you are about to stop having your period (premenopausal) and you may become pregnant, seek counseling before you get pregnant.  Take 400 to 800 micrograms (mcg) of folic acid every day if you become pregnant.  Ask for birth control (contraception) if you want to prevent pregnancy.  Osteoporosis and menopause  Osteoporosis is a disease in which the bones lose minerals and strength with aging. This can result in bone fractures. If you are 65 years old or older, or if you are at risk for osteoporosis and fractures, ask your health care provider if you should:  Be screened for bone loss.  Take a calcium or vitamin D supplement to lower your risk of fractures.  Be given hormone replacement therapy (HRT) to treat symptoms of menopause.  Follow these instructions at home:  Alcohol use  Do not drink alcohol if:  Your health care provider tells you not to drink.  You are pregnant, may be pregnant, or are planning to become pregnant.  If you drink alcohol:  Limit how much you have to:  0-1 drink a day.  Know how much alcohol is in your drink. In the [place] one drink equals one 12 oz bottle of beer (355 mL), one 5 oz glass of wine (148 mL), or one 1½ oz glass of hard liquor (44 mL).  Lifestyle  Do not use any products that contain nicotine or tobacco. These products include cigarettes, chewing tobacco, and vaping devices, such as e-cigarettes. If you need help quitting, ask your health care provider.  Do not use street drugs.  Do not share needles.  Ask your health care provider for help if you need support or information about quitting drugs.  General instructions  Schedule regular health, dental, and eye exams.  Stay current with your vaccines.  Tell your health care provider if:  You often feel depressed.  You have ever been abused or do not feel safe at home.  Summary  Adopting a healthy lifestyle and getting preventive care are important in promoting health and wellness.  Follow your health care  provider's instructions about healthy diet, exercising, and getting tested or screened for diseases.  Follow your health care provider's instructions on monitoring your cholesterol and blood pressure.  This information is not intended to replace advice given to you by your health care provider. Make sure you discuss any questions you have with your health care provider.  Document Revised: 05/09/2022 Document Reviewed: 05/09/2022  [employer] Patient Education © 2024 [employer]      MyPlate from [employer]    MyPlate is an outline of a general healthy diet based on the Dietary Guidelines for Americans, 7908-9064, from the [place] Department of Agriculture (USDA). It sets guidelines for how much food you should eat from each food group based on your age, sex, and level of physical activity.  What are tips for following MyPlate?  To follow MyPlate recommendations:  Eat a wide variety of fruits and vegetables, grains, and protein foods.  Serve smaller portions and eat less food throughout the day.  Limit portion sizes to avoid overeating.  Enjoy your food.  Get at least 150 minutes of exercise every week. This is about 30 minutes each day, 5 or more days per week.  It can be difficult to have every meal look like MyPlate. Think about MyPlate as eating guidelines for an entire day, rather than each individual meal.  Fruits and vegetables  Make one half of your plate fruits and vegetables.  Eat many different colors of fruits and vegetables each day.  For a 2,000-calorie daily food plan, eat:  2½ cups of vegetables every day.  2 cups of fruit every day.  1 cup is equal to:  1 cup raw or cooked vegetables.  1 cup raw fruit.  1 medium-sized orange, apple, or banana.  1 cup 100% fruit or vegetable juice.  2 cups raw leafy greens, such as lettuce, spinach, or kale.  ½ cup dried fruit.  Grains  One fourth of your plate should be grains.  Make at least half of the grains you eat each day whole grains.  For a 2,000-calorie daily food  plan, eat 6 oz of grains every day.  1 oz is equal to:  1 slice bread.  1 cup cereal.  ½ cup cooked rice, cereal, or pasta.  Protein  One fourth of your plate should be protein.  Eat a wide variety of protein foods, including meat, poultry, fish, eggs, beans, nuts, and tofu.  For a 2,000-calorie daily food plan, eat 5½ oz of protein every day.  1 oz is equal to:  1 oz meat, poultry, or fish.  ¼ cup cooked beans.  1 egg.  ½ oz nuts or seeds.  1 Tbsp peanut butter.  Dairy  Drink fat-free or low-fat (1%) milk.  Eat or drink dairy as a side to meals.  For a 2,000-calorie daily food plan, eat or drink 3 cups of dairy every day.  1 cup is equal to:  1 cup milk, yogurt, cottage cheese, or soy milk (soy beverage).  2 oz processed cheese.  1½ oz natural cheese.  Fats, oils, salt, and sugars  Only small amounts of oils are recommended.  Avoid foods that are high in calories and low in nutritional value (empty calories), like foods high in fat or added sugars.  Choose foods that are low in salt (sodium). Choose foods that have less than 140 milligrams (mg) of sodium per serving.  Drink water instead of sugary drinks. Drink enough fluid to keep your urine pale yellow.  Where to find support  Work with your health care provider or a dietitian to develop a customized eating plan that is right for you.  Download an danelle (mobile application) to help you track your daily food intake.  Where to find more information  USDA: ChooseMyPlate.gov  Summary  MyPlate is a general guideline for healthy eating from the USDA. It is based on the Dietary Guidelines for Americans, 4684-9124.  In general, fruits and vegetables should take up one half of your plate, grains should take up one fourth of your plate, and protein should take up one fourth of your plate.  This information is not intended to replace advice given to you by your health care provider. Make sure you discuss any questions you have with your health care provider.  Document  Revised: 11/08/2021 Document Reviewed: 11/08/2021  [employer] Patient Education © 2024 [employer]      Exercising to Stay Healthy  To become healthy and stay healthy, it is recommended that you do moderate-intensity and vigorous-intensity exercise. You can tell that you are exercising at a moderate intensity if your heart starts beating faster and you start breathing faster but can still hold a conversation. You can tell that you are exercising at a vigorous intensity if you are breathing much harder and faster and cannot hold a conversation while exercising.  How can exercise benefit me?  Exercising regularly is important. It has many health benefits, such as:  Improving overall fitness, flexibility, and endurance.  Increasing bone density.  Helping with weight control.  Decreasing body fat.  Increasing muscle strength and endurance.  Reducing stress and tension, anxiety, depression, or anger.  Improving overall health.  What guidelines should I follow while exercising?  Before you start a new exercise program, talk with your health care provider.  Do not exercise so much that you hurt yourself, feel dizzy, or get very short of breath.  Wear comfortable clothes and wear shoes with good support.  Drink plenty of water while you exercise to prevent dehydration or heat stroke.  Work out until your breathing and your heartbeat get faster (moderate intensity).  How often should I exercise?  Choose an activity that you enjoy, and set realistic goals. Your health care provider can help you make an activity plan that is individually designed and works best for you.  Exercise regularly as told by your health care provider. This may include:  Doing strength training two times a week, such as:  Lifting weights.  Using resistance bands.  Push-ups.  Sit-ups.  Yoga.  Doing a certain intensity of exercise for a given amount of time. Choose from these options:  A total of 150 minutes of moderate-intensity exercise every week.  A  total of 75 minutes of vigorous-intensity exercise every week.  A mix of moderate-intensity and vigorous-intensity exercise every week.  Children, pregnant women, people who have not exercised regularly, people who are overweight, and older adults may need to talk with a health care provider about what activities are safe to perform. If you have a medical condition, be sure to talk with your health care provider before you start a new exercise program.  What are some exercise ideas?  Moderate-intensity exercise ideas include:  Walking 1 mile (1.6 km) in about 15 minutes.  Biking.  Hiking.  Golfing.  Dancing.  Water aerobics.  Vigorous-intensity exercise ideas include:  Walking 4.5 miles (7.2 km) or more in about 1 hour.  Jogging or running 5 miles (8 km) in about 1 hour.  Biking 10 miles (16.1 km) or more in about 1 hour.  Lap swimming.  Roller-skating or in-line skating.  Cross-country skiing.  Vigorous competitive sports, such as football, basketball, and soccer.  Jumping rope.  Aerobic dancing.  What are some everyday activities that can help me get exercise?  Yard work, such as:  Pushing a .  Raking and bagging leaves.  Washing your car.  Pushing a stroller.  Shoveling snow.  Gardening.  Washing windows or floors.  How can I be more active in my day-to-day activities?  Use stairs instead of an elevator.  Take a walk during your lunch break.  If you drive, park your car farther away from your work or school.  If you take public transportation, get off one stop early and walk the rest of the way.  Stand up or walk around during all of your indoor phone calls.  Get up, stretch, and walk around every 30 minutes throughout the day.  Enjoy exercise with a friend. Support to continue exercising will help you keep a regular routine of activity.  Where to find more information  You can find more information about exercising to stay healthy from:  [place] Department of Health and Human Services:  www.hhs.gov  Centers for Disease Control and Prevention (CDC): www.cdc.gov  Summary  Exercising regularly is important. It will improve your overall fitness, flexibility, and endurance.  Regular exercise will also improve your overall health. It can help you control your weight, reduce stress, and improve your bone density.  Do not exercise so much that you hurt yourself, feel dizzy, or get very short of breath.  Before you start a new exercise program, talk with your health care provider.  This information is not intended to replace advice given to you by your health care provider. Make sure you discuss any questions you have with your health care provider.  Document Revised: 04/15/2022 Document Reviewed: 04/15/2022  [employer] Patient Education © 2024 [employer]

## 2024-12-16 NOTE — PROGRESS NOTES
Subjective     Chief Complaint:  Physical Exam.    Chief Complaint   Patient presents with    Annual Exam    Hyperlipidemia     Follow up    Prediabetes         History of Present Illness    History obtained from the patient.    The patient has Bronchiectasis which has been stable.   She has not needed to see Dr. Mon recently, and has not needed her ProAir inhaler.  She denies chest pain, shortness of breath,  ELENA, wheezing, cough.  She is on Allegra and Flonase for Seasonal Allergies.        Cardiac Follow-Up: The patient is here today for a follow-up visit.  Last physical exam and labs was 1/18/2023.     Her Hypertension has resolved.  Medication: None.  Her Hyperlipidemia has been stable.   Her LDL goal is < 130 last LDL was 133, TG 57   Medication: None. She has taken Crestor and Lipitor in the past and they both caused myalgias.   Her Prediabetes has been stable.  Medication:  None.      Interval Events: Last HgA1c on 1/18/2023 was 5.7.        Symptoms:   Denies chest pain, ELENA, orthopnea, PND, palpitations, syncope, lower extremity edema,  claudication, lightheadedness, and dizziness.  Associated Symptoms: Weight increased 31 pounds since 1/18/2023.   She reports stable fatigue.  No headache, polydipsia, polyuria, myalgias, arthralgias, memory loss, or concentration issues.       Lifestyle: She consumes a diverse and healthy diet.  She exercises 3-4 times per week (Pilates and strength training), but just re-started 1 month ago due to an injury in April 2024..  Tobacco Use: Former smoker (1 ppd x 2 years).       GERD Follow-Up: The patient is being seen for follow-up of Gastroesophageal Reflux Disease, which is stable.   Comorbid Illnesses:  None.  Interval Events: None.  Symptoms:  She denies abdominal pain, heartburn, acid reflux, nausea, vomiting, hematemesis, dysphagia, odynophagia, melena, hematochezia, early satiety, belching, and bloating.  Associated Symptoms: no chronic sore throat,  "hoarseness, cough, or wheezing  Medications: None.     Anxiety Disorder Follow-up: The patient is here for follow-up of Anxiety disorder, which is worse  Interval Events:  She has been going to counseling and just started with a new counselor.  Symptoms: Anxiety is overall worse.  She reports sleep disturbance due to \"hormones\".  Denies depression, panic attacks, anhedonia, memory loss, and concentration issues.    Associated Symptoms: Denies suicidal ideation and thoughts of self-harm.  Medication:  Alprazolam as needed (about 4-5 times per year).        Key Ospina is a 49 y.o. female who presents for an Annual Physical.      PMH, PSH, SocHx, FamHx, Allergies, and Medications: Reviewed and updated.    Outpatient Medications Prior to Visit   Medication Sig Dispense Refill    fexofenadine (ALLEGRA) 180 MG tablet Take 1 tablet by mouth Daily.      fluticasone (FLONASE) 50 MCG/ACT nasal spray Administer 2 sprays into the nostril(s) as directed by provider Daily. Administer 1 spray in each nostril twice daily.      tretinoin (RETIN-A) 0.025 % cream Apply 1 Application topically to the appropriate area as directed Daily. 45 g 5    ALPRAZolam (Xanax) 0.25 MG tablet Take 1 tablet by mouth Daily As Needed for Anxiety. 30 tablet 0    azithromycin (Zithromax Z-Papa) 250 MG tablet Take 2 tablets the first day, then 1 tablet daily for 4 days. 6 tablet 0    fluconazole (Diflucan) 150 MG tablet Take one tablet and repeat in 3 days as needed (Patient not taking: Reported on 12/16/2024) 3 tablet 0     No facility-administered medications prior to visit.       Immunization History   Administered Date(s) Administered    COVID-19 (MODERNA) 1st,2nd,3rd Dose Monovalent 03/01/2022    COVID-19 (PFIZER) Purple Cap Monovalent 01/22/2021, 02/19/2021    FluMist 2-49yrs 10/25/2013    PPD Test 03/03/2017    Pneumococcal Polysaccharide (PPSV23) 07/16/2020    Td (TDVAX) 05/26/2005    Tdap 03/02/2013, 01/18/2023         Patient Active " Problem List   Diagnosis    Allergic rhinitis    Elevated EBV antibody titer quantitative    Gastroesophageal reflux disease    Hyperlipidemia    Prediabetes    Anxiety disorder    Positive YOKO (antinuclear antibody)    Bronchiectasis without complication    Other forms of systemic lupus erythematosus       Health Habits:  Dental Exam. up to date  Eye Exam. up to date  Hearing Loss:  No  Exercise:  3-4  times/week.  Current exercise activities include: pilates, walking, and strength training  Diet: Healthy  Multivitamin: No    Safe Driving:  Yes  Seat Belt:  Yes  Bike Helmet:  N/A  Skin Screening:  Yes  Sunscreen: Yes  SBE / CLAU: Yes  Sexual Activity:  Yes  Birth Control:  condoms  STD Prevention:  condoms    Last Pap: 2022, normal (no ECC)-per GYN  Last Mammogram: 2019, category 1  Last DEXA Scan: N/A  Last Colonoscopy: None  Last PSA: N/A    Social:    Social History     Socioeconomic History    Marital status: Significant Other    Number of children: 2   Tobacco Use    Smoking status: Former     Current packs/day: 0.00     Average packs/day: 0.5 packs/day for 2.0 years (1.0 ttl pk-yrs)     Types: Cigarettes     Start date: 1996     Quit date: 1998     Years since quittin.9     Passive exposure: Never    Smokeless tobacco: Never   Vaping Use    Vaping status: Never Used   Substance and Sexual Activity    Alcohol use: Yes     Comment: glass of wine or a cocktail, once per week    Drug use: No    Sexual activity: Yes     Partners: Male     Birth control/protection: Condom         Current Medical Providers:    Cynthia Simental MD (Internal Medicine / Pediatrics)    The [facility] providers who are involved in the care of this patient are listed above.         Review of Systems   Constitutional:  Positive for fatigue and unexpected weight change. Negative for appetite change, chills and fever.        No night sweats.    HENT:  Negative for congestion, ear discharge, ear pain, facial swelling,  hearing loss, nosebleeds, postnasal drip, rhinorrhea, sinus pressure, sinus pain, sneezing, sore throat, tinnitus and voice change.         Denies snoring.   Eyes:  Negative for photophobia, pain, discharge, redness, itching and visual disturbance.   Respiratory:  Positive for cough (residual from URI last week.). Negative for chest tightness, shortness of breath and wheezing.         No chest congestion.  No hemoptysis.   Cardiovascular:  Negative for chest pain, palpitations and leg swelling.        No orthopnea, ELENA, or PND.  No claudication or syncope.   Gastrointestinal:  Negative for abdominal pain, blood in stool, constipation, diarrhea, nausea, rectal pain and vomiting.        No melena.  No hematemesis.  No heartburn, dysphagia or odynophagia.  No early satiety, belching, or bloating.    Endocrine: Negative for cold intolerance, heat intolerance, polydipsia, polyphagia and polyuria.        Has hair loss, but no dry skin.  No hot flashes.     Genitourinary:  Positive for menstrual problem. Negative for difficulty urinating, dyspareunia, dysuria, flank pain, frequency, hematuria, pelvic pain, urgency, vaginal bleeding and vaginal discharge.        No nocturia, incomplete emptying, or incontinence.   Musculoskeletal:  Negative for arthralgias, back pain, gait problem, joint swelling, myalgias, neck pain and neck stiffness.        No joint stiffness.   Skin:  Negative for rash.        No new skin lesions or changes in skin lesions. No breast pain or masses.  No nipple discharge or nipple inversion.   Neurological:  Negative for dizziness, tremors, syncope, speech difficulty, weakness, light-headedness, numbness and headaches.        No tingling.  No memory loss.  No decreased concentration.   Hematological:  Negative for adenopathy. Does not bruise/bleed easily.   Psychiatric/Behavioral:  Positive for sleep disturbance. Negative for confusion, self-injury and suicidal ideas. The patient is nervous/anxious.       "   No depression.           Objective     Vitals:    12/16/24 0804   BP: 124/80   Pulse: 70   Temp: 97.4 °F (36.3 °C)   Weight: 73 kg (161 lb)   Height: 163.8 cm (64.5\")       Body mass index is 27.21 kg/m².    Physical Exam  Vitals and nursing note reviewed.   Constitutional:       Appearance: Normal appearance. She is well-developed.      Comments: BMI greater than 25   HENT:      Head: Normocephalic and atraumatic.      Right Ear: Tympanic membrane, ear canal and external ear normal.      Left Ear: Tympanic membrane, ear canal and external ear normal.      Mouth/Throat:      Mouth: Mucous membranes are moist. No oral lesions.      Pharynx: Oropharynx is clear.      Comments: Tonsils absent  Eyes:      Extraocular Movements: Extraocular movements intact.      Conjunctiva/sclera: Conjunctivae normal.      Pupils: Pupils are equal, round, and reactive to light.      Comments: Fundi normal bilaterally.   Neck:      Thyroid: No thyromegaly.      Vascular: No carotid bruit.   Cardiovascular:      Rate and Rhythm: Normal rate and regular rhythm.      Pulses: Normal pulses.      Heart sounds: Normal heart sounds. No murmur heard.     No friction rub. No gallop.   Pulmonary:      Effort: Pulmonary effort is normal.      Breath sounds: Normal breath sounds.   Chest:   Breasts:     Right: No inverted nipple, mass, nipple discharge, skin change or tenderness.      Left: No inverted nipple, mass, nipple discharge, skin change or tenderness.   Abdominal:      General: Bowel sounds are normal. There is no distension or abdominal bruit.      Palpations: Abdomen is soft. There is no hepatomegaly, splenomegaly or mass.      Tenderness: There is no abdominal tenderness.   Genitourinary:     Comments:  and rectal exam deferred.  Musculoskeletal:         General: Normal range of motion.      Cervical back: Normal range of motion and neck supple.      Right lower leg: No edema.      Left lower leg: No edema.   Lymphadenopathy:      " Cervical: No cervical adenopathy.      Upper Body:      Right upper body: No supraclavicular or axillary adenopathy.      Left upper body: No supraclavicular or axillary adenopathy.   Skin:     Findings: No rash.      Comments: No atypical skin lesions.   Neurological:      Mental Status: She is alert.      Cranial Nerves: No cranial nerve deficit.      Motor: Motor function is intact. No abnormal muscle tone.      Coordination: Coordination is intact.      Gait: Gait is intact.      Deep Tendon Reflexes: Reflexes are normal and symmetric.   Psychiatric:         Mood and Affect: Mood normal.         PHQ-2 Depression Screening  Little interest or pleasure in doing things? Not at all   Feeling down, depressed, or hopeless? Not at all   PHQ-2 Total Score 0         Counseling was given to patient for the following topics: appropriate exercise, healthy eating habits, disease prevention, risk factors for cancer, importance of self breast exam and breast health, importance of immunizations, including risks and benefits, sun safety, seatbelt use, safe driving, and safe sex. Also discussed the importance of regular dental and vision care, as well recommendation for a yearly screening skin exam after age 40.  Written information provided to patient on these topics and other health maintenance issues.    Results for orders placed or performed in visit on 12/16/24   POC Glycosylated Hemoglobin (Hb A1C)    Collection Time: 12/16/24  5:24 PM    Specimen: Blood   Result Value Ref Range    Hemoglobin A1C 5.7 4.5 - 5.7 %    Lot Number [id number]     Expiration Date 8,082,026          Diagnoses and all orders for this visit:    1. Encounter for health maintenance examination in adult (Primary)    2. Dyslipidemia  -     Lipid Panel  -     Comprehensive Metabolic Panel  -     TSH  -     CBC & Differential    3. Bronchiectasis without complication   Follow up per Pulmonary as needed.    4. Prediabetes  -     POC Glycosylated Hemoglobin  (Hb A1C)  -     Lipid Panel  -     Comprehensive Metabolic Panel  -     TSH  -     CBC & Differential    5. Gastroesophageal reflux disease, unspecified whether esophagitis present   Stable, no medication.    6. Generalized anxiety disorder  -     ALPRAZolam (Xanax) 0.25 MG tablet; Take 1 tablet by mouth Daily As Needed for Anxiety.  Dispense: 30 tablet; Refill: 0- REFILL  -     ToxAssure Flex 23, Ur -    7. High risk medication use  -     ToxAssure Flex 23, Ur -    8. Screening for colon cancer  -     Cologuard - Stool, Per Rectum; Future      The patient states her Pap Smear scheduled with GYN for January 2025 and her Mammogram is scheduled for March 2025.    I recommended the Influenza vaccine, the COVID-19 bivalent vaccine, and the Hepatitis A vaccine, in our office or at the pharmacy,.  The patient declined all 3 of these vaccines today in office.  The patient was informed she can be hospitalized and die from Influenza and/or COVID-19 infection.    The patient declined scheduling a Cardiac CT Scan today.      BMI is >= 25 and <30. (Overweight) The following options were offered after discussion;: exercise counseling/recommendations and nutrition counseling/recommendations            Return in about 1 year (around 12/16/2025) for Annual physical, fasting.

## 2024-12-22 LAB
1OH-MIDAZOLAM UR QL SCN: NOT DETECTED NG/MG CREAT
6MAM UR QL SCN: NEGATIVE NG/ML
7AMINOCLONAZEPAM/CREAT UR: NOT DETECTED NG/MG CREAT
A-OH ALPRAZ/CREAT UR: NOT DETECTED NG/MG CREAT
A-OH-TRIAZOLAM/CREAT UR CFM: NOT DETECTED NG/MG CREAT
ACP UR QL CFM: NOT DETECTED
ALPRAZ/CREAT UR CFM: NOT DETECTED NG/MG CREAT
AMPHETAMINES UR QL SCN: NEGATIVE NG/ML
APAP UR QL SCN: NEGATIVE UG/ML
BARBITURATES UR QL SCN: NEGATIVE NG/ML
BENZODIAZ SCN METH UR: NEGATIVE
BUPRENORPHINE UR QL SCN: NEGATIVE
BUPRENORPHINE/CREAT UR: NOT DETECTED NG/MG CREAT
CANNABINOIDS UR QL SCN: NEGATIVE NG/ML
CARISOPRODOL UR QL: NEGATIVE NG/ML
CLONAZEPAM/CREAT UR CFM: NOT DETECTED NG/MG CREAT
COCAINE+BZE UR QL SCN: NEGATIVE NG/ML
CREAT UR-MCNC: 18 MG/DL
D-METHORPHAN UR-MCNC: NOT DETECTED NG/ML
D-METHORPHAN+LEVORPHANOL UR QL: NOT DETECTED
DESALKYLFLURAZ/CREAT UR: NOT DETECTED NG/MG CREAT
DIAZEPAM/CREAT UR: NOT DETECTED NG/MG CREAT
ETHANOL UR QL SCN: NEGATIVE G/DL
ETHANOL UR QL SCN: NEGATIVE NG/ML
FENTANYL CTO UR SCN-MCNC: NEGATIVE NG/ML
FENTANYL/CREAT UR: NOT DETECTED NG/MG CREAT
FLUNITRAZEPAM UR QL SCN: NOT DETECTED NG/MG CREAT
GABAPENTIN UR-MCNC: NEGATIVE UG/ML
HALLUCINOGENS UR: NEGATIVE
HYPNOTICS UR QL SCN: NEGATIVE
KETAMINE UR QL: NOT DETECTED
LORAZEPAM/CREAT UR: NOT DETECTED NG/MG CREAT
MEPERIDINE UR QL SCN: NEGATIVE NG/ML
METHADONE UR QL SCN: NEGATIVE NG/ML
METHADONE+METAB UR QL SCN: NEGATIVE NG/ML
MIDAZOLAM/CREAT UR CFM: NOT DETECTED NG/MG CREAT
MISCELLANEOUS, UR: NEGATIVE
NORBUPRENORPHINE/CREAT UR: NOT DETECTED NG/MG CREAT
NORDIAZEPAM/CREAT UR: NOT DETECTED NG/MG CREAT
NORFENTANYL/CREAT UR: NOT DETECTED NG/MG CREAT
NORFLUNITRAZEPAM UR-MCNC: NOT DETECTED NG/MG CREAT
NORKETAMINE UR-MCNC: NOT DETECTED UG/ML
OPIATES UR SCN-MCNC: NEGATIVE NG/ML
OXAZEPAM/CREAT UR: NOT DETECTED NG/MG CREAT
OXYCODONE CTO UR SCN-MCNC: NEGATIVE NG/ML
PCP UR QL SCN: NEGATIVE NG/ML
PRESCRIBED MEDICATIONS: ABNORMAL
PROPOXYPH UR QL SCN: NEGATIVE NG/ML
TAPENTADOL CTO UR SCN-MCNC: NEGATIVE NG/ML
TEMAZEPAM/CREAT UR: NOT DETECTED NG/MG CREAT
TRAMADOL UR QL SCN: NEGATIVE NG/ML
ZALEPLON UR-MCNC: NOT DETECTED NG/ML
ZOLPIDEM PHENYL-4-CARB UR QL SCN: NOT DETECTED
ZOLPIDEM UR QL SCN: NOT DETECTED
ZOPICLONE-N-OXIDE UR-MCNC: NOT DETECTED NG/ML

## 2025-01-23 ENCOUNTER — OFFICE VISIT (OUTPATIENT)
Dept: OBSTETRICS AND GYNECOLOGY | Facility: CLINIC | Age: 50
End: 2025-01-23
Payer: COMMERCIAL

## 2025-01-23 VITALS
HEIGHT: 64 IN | BODY MASS INDEX: 27.21 KG/M2 | SYSTOLIC BLOOD PRESSURE: 122 MMHG | WEIGHT: 159.4 LBS | DIASTOLIC BLOOD PRESSURE: 84 MMHG

## 2025-01-23 DIAGNOSIS — Z01.419 WOMEN'S ANNUAL ROUTINE GYNECOLOGICAL EXAMINATION: Primary | ICD-10-CM

## 2025-01-23 DIAGNOSIS — Z12.39 ENCOUNTER FOR BREAST CANCER SCREENING USING NON-MAMMOGRAM MODALITY: ICD-10-CM

## 2025-01-23 NOTE — PROGRESS NOTES
Gynecologic Annual Exam Note          GYN Annual Exam     Gynecologic Exam        Subjective     HPI  Key Ospina is a 49 y.o. female, , who presents for annual well woman exam as a established patient. There were no changes to her medical or surgical history since her last visit..  Patient's last menstrual period was 2025.   irregular  The flow is light-mod. She reports dysmenorrhea is mild occurring first 1-2 days of flow. Marital Status: significant . She is sexually active. She has not had new partners.. STD testing recommendations have been explained to the patient and she declines STD testing.    The patient would like to discuss the following complaints today: none    Additional OB/GYN History   contraceptive methods: Condoms and Withdrawal   Desires to: do not start contraception  History of migraines: no    Last Pap : 2022. Result: negative.  Last Completed Pap Smear            Ordered - PAP SMEAR (Every 3 Years) Ordered on 2022  SCANNED - PAP SMEAR    2019  Patient-Reported (Performed Externally) - Normal per patient report (GYN).  Reports normal pelvic exam 2020.    2014   Pap smear component of  PAP SMEAR    2014  Done - normal per pt    2012  Done - normal per pt    Only the first 5 history entries have been loaded, but more history exists.                  History of abnormal Pap smear: no  Family history of uterine, colon, breast, or ovarian cancer: no  Performs monthly Self-Breast Exam: yes  Last mammogram: 2019. Done at . There is a copy in the chart.    Last Completed Mammogram       This patient has no relevant Health Maintenance data.            Colonoscopy: has had a colonoscopy 12 years ago  Exercises Regularly: yes  Feelings of Anxiety or Depression: yes - managed  Tobacco Usage?: No       Current Outpatient Medications:     ALPRAZolam (Xanax) 0.25 MG tablet, Take 1 tablet by mouth Daily As Needed for  Anxiety., Disp: 30 tablet, Rfl: 0    fexofenadine (ALLEGRA) 180 MG tablet, Take 1 tablet by mouth Daily., Disp: , Rfl:     fluticasone (FLONASE) 50 MCG/ACT nasal spray, Administer 2 sprays into the nostril(s) as directed by provider Daily. Administer 1 spray in each nostril twice daily., Disp: , Rfl:     tretinoin (RETIN-A) 0.025 % cream, Apply 1 Application topically to the appropriate area as directed Daily., Disp: 45 g, Rfl: 5     Patient denies the need for medication refills today.    OB History          2    Para   2    Term   2            AB        Living   2         SAB        IAB        Ectopic        Molar        Multiple        Live Births   2                Past Medical History:   Diagnosis Date    Allergic rhinitis     Anemia     History of; fe Deficiency- intermittent    Anxiety disorder     Breast mass     Bronchiectasis without complication     Dx by high res CT scan .    ELENA (dyspnea on exertion) 2016    Neg Cardiac and pulmonary workup. RESOLVED    Elevated EBV antibody titer quantitative     Fatigue     Gastroesophageal reflux disease     Description: dx     History of abdominal pain     Left upper quad    History of anemia     History of cardiovascular stress test 2013    Normal treadmill test.  and 9/10- normal exercise echo    History of chest pain     History of chest x-ray 2016    Unremarkable    History of chest x-ray 2013    The lungs are clear. The heart is normal. There is no active disease in the chest.    History of closed head injury     History of colonoscopy 2013    normal    History of esophagogastroduodenoscopy 2013    path c/w mild chronic gastritis and esophagitis    History of Helicobacter infection 2011    History of infectious mononucleosis     dx 8/10-chronically elevated EBV Titers    History of influenza 2014    History of PFTs 2016    Normal spirometry. ? lung volumes. Isolated mild reduction in  "diffusion capacity of unclear etiology.    History of PFTs 2016    Normal spirometry. Post bronchodilator test not clearly improved.    History of varicella     Hyperlipidemia     Description: nl Cardiac CRP/Homocysteine. hgh LDL-P    Hypertension     Description: (normal stress echo  and 9/10)  History of Cardiovascular Stress Test  · normal exercise echo  and 9/10. - normal treadmill Spect.    Myalgia     and myositis     Other forms of systemic lupus erythematosus     Positive YOKO (antinuclear antibody)     (and positive ds DNA)- sees Dr. Glynn ([facility]).    Prediabetes     Description: dx         Past Surgical History:   Procedure Laterality Date     SECTION      CHOLECYSTECTOMY  2002    MOUTH SURGERY      TONSILLECTOMY         Health Maintenance   Topic Date Due    MAMMOGRAM  2021    COLORECTAL CANCER SCREENING  2023    Annual Gynecologic Pelvic and Breast Exam  2024    COVID-19 Vaccine ( - - season) 2024    INFLUENZA VACCINE  2025 (Originally 2024)    PAP SMEAR  2025    ANNUAL PHYSICAL  2025    LIPID PANEL  2025    BMI FOLLOWUP  2025    MOST FORM  2026    TDAP/TD VACCINES (4 - Td or Tdap) 2033    HEPATITIS C SCREENING  Completed    Pneumococcal Vaccine 0-64  Aged Out       The additional following portions of the patient's history were reviewed and updated as appropriate: allergies, current medications, past family history, past medical history, past social history, and past surgical history.    Review of Systems      I have reviewed and agree with the HPI, ROS, and historical information as entered above. Janeth Sequeira MD          Objective   /84   Ht 162.6 cm (64\")   Wt 72.3 kg (159 lb 6.4 oz)   LMP 2025   BMI 27.36 kg/m²     Physical Exam  Vitals and nursing note reviewed. Exam conducted with a chaperone present.   Constitutional:       Appearance: She is " well-developed.   HENT:      Head: Normocephalic and atraumatic.   Neck:      Thyroid: No thyroid mass or thyromegaly.   Cardiovascular:      Rate and Rhythm: Normal rate and regular rhythm.      Heart sounds: No murmur heard.  Pulmonary:      Effort: Pulmonary effort is normal. No retractions.      Breath sounds: Normal breath sounds. No wheezing, rhonchi or rales.   Chest:      Chest wall: No mass or tenderness.   Breasts:     Right: Normal. No mass, nipple discharge, skin change or tenderness.      Left: Normal. No mass, nipple discharge, skin change or tenderness.   Abdominal:      General: Bowel sounds are normal.      Palpations: Abdomen is soft. Abdomen is not rigid. There is no mass.      Tenderness: There is no abdominal tenderness. There is no guarding.      Hernia: No hernia is present. There is no hernia in the left inguinal area.   Genitourinary:     Labia:         Right: No rash, tenderness or lesion.         Left: No rash, tenderness or lesion.       Vagina: Normal. No vaginal discharge or lesions.      Cervix: No cervical motion tenderness, discharge, lesion or cervical bleeding.      Uterus: Normal. Not enlarged, not fixed and not tender.       Adnexa:         Right: No mass or tenderness.          Left: No mass or tenderness.        Rectum: No external hemorrhoid.   Musculoskeletal:      Cervical back: Normal range of motion. No muscular tenderness.   Neurological:      Mental Status: She is alert and oriented to person, place, and time.   Psychiatric:         Behavior: Behavior normal.            Assessment and Plan    Problem List Items Addressed This Visit    None  Visit Diagnoses       Women's annual routine gynecological examination    -  Primary    Relevant Orders    LIQUID-BASED PAP SMEAR WITH HPV GENOTYPING REGARDLESS OF INTERPRETATION ([facility])    Encounter for breast cancer screening using non-mammogram modality        Relevant Orders    Mammo Screening Digital Tomosynthesis Bilateral  With CAD            GYN annual well woman exam.   Pap guidelines reviewed.  Recommended use of Vitamin D replacement and getting adequate calcium in her diet. (1500mg)  Reviewed monthly self breast exams.  Instructed to call with lumps, pain, or breast discharge.    Continue yearly mammography  Reviewed HPV guidelines.  Reviewed exercise as a preventative health measures.   Colonoscopy recommended now at 46.  Offered referral. Dr. Simental has ordered a Cologard.  Discussed signs and symptoms related to perimenopause. Discussed OTC treatments for these symptoms. Her periods are becoming somewhat irregular but denies IMB.    Return in about 1 year (around 1/23/2026).     Janeth Sequeira MD  01/23/2025

## 2025-01-24 LAB — REF LAB TEST METHOD: NORMAL

## 2025-05-20 ENCOUNTER — TELEMEDICINE (OUTPATIENT)
Dept: INTERNAL MEDICINE | Facility: CLINIC | Age: 50
End: 2025-05-20
Payer: COMMERCIAL

## 2025-05-20 VITALS — HEART RATE: 50 BPM | RESPIRATION RATE: 12 BRPM | WEIGHT: 154 LBS | BODY MASS INDEX: 26.43 KG/M2

## 2025-05-20 DIAGNOSIS — H00.014 HORDEOLUM EXTERNUM OF LEFT UPPER EYELID: Primary | ICD-10-CM

## 2025-05-20 PROCEDURE — 99213 OFFICE O/P EST LOW 20 MIN: CPT | Performed by: INTERNAL MEDICINE

## 2025-05-20 RX ORDER — FLUCONAZOLE 150 MG/1
150 TABLET ORAL ONCE
Qty: 2 TABLET | Refills: 1 | Status: SHIPPED | OUTPATIENT
Start: 2025-05-20 | End: 2025-05-20

## 2025-05-20 RX ORDER — MUPIROCIN 20 MG/G
1 OINTMENT TOPICAL 3 TIMES DAILY
Qty: 30 G | Refills: 0 | Status: SHIPPED | OUTPATIENT
Start: 2025-05-20 | End: 2025-05-27

## 2025-05-20 RX ORDER — CEFDINIR 300 MG/1
300 CAPSULE ORAL 2 TIMES DAILY
Qty: 14 CAPSULE | Refills: 0 | Status: SHIPPED | OUTPATIENT
Start: 2025-05-20 | End: 2025-05-27

## 2025-05-20 NOTE — PROGRESS NOTES
Subjective       Key Ospina is a 49 y.o. female.     Chief Complaint   Patient presents with    Stye     Had one 6 weeks ago  New one 6-7 days  Left eye       History obtained from the patient.    The patient has chosen to receive care through a Telehealth visit.  The patient consented to use a video/audio connection for his/her medical care today.  This service was conducted via Telehealth audio/visual by I-phone.  Connected to the patient using [employer].  This visit occurred with the Provider located at [facility], [place], operated by [facility] (@ [place], [place]) and the patient located at work in the [facility].  Other participants in this Telehealth visit included: None.  No technical issues occurred during this visit.          Eye Problem   The left eye is affected. This is a new problem. Episode onset: 6 weeks ago, resolved then recurred x 2, last 6 days ago. The problem occurs constantly. The problem has been gradually worsening. There was no injury mechanism. The pain is mild. There is No known exposure to pink eye. She Does not wear contacts. Associated symptoms include eye redness (of the eyelid) and itching. Pertinent negatives include no blurred vision, eye discharge, double vision, fever, foreign body sensation, photophobia or recent URI. Treatments tried: warm compresses. The treatment provided no relief.          The following portions of the patient's history were reviewed and updated as appropriate: allergies, current medications, past family history, past medical history, past social history, past surgical history, and problem list.      Review of Systems   Constitutional:  Negative for chills and fever.   HENT:  Positive for congestion (mild).    Eyes:  Positive for redness (of the eyelid) and itching. Negative for blurred vision, double vision, photophobia and discharge.           Objective     Pulse 50, resp. rate 12, weight 69.9 kg (154 lb), not  currently breastfeeding.    Physical Exam  Vitals reviewed.   Constitutional:       Comments: BMI greater than 25   Eyes:      Comments: Left upper eyelid edematous and erythematous   Pulmonary:      Effort: Pulmonary effort is normal. No respiratory distress.   Neurological:      Mental Status: She is alert.   Psychiatric:         Mood and Affect: Mood normal.           Assessment & Plan   Diagnoses and all orders for this visit:    1. Hordeolum externum of left upper eyelid (Primary)  -     cefdinir (OMNICEF) 300 MG capsule; Take 1 capsule by mouth 2 (Two) Times a Day for 7 days.  Dispense: 14 capsule; Refill: 0  -     mupirocin (BACTROBAN) 2 % ointment; Apply 1 Application topically to the appropriate area as directed 3 (Three) Times a Day for 7 days.  Dispense: 30 g; Refill: 0  -     fluconazole (Diflucan) 150 MG tablet; Take 1 tablet by mouth 1 (One) Time for 1 dose. May repeat after 4 days.  Dispense: 2 tablet; Refill: 1      Continue warm compresses.    Return if symptoms worsen or fail to improve.